# Patient Record
Sex: FEMALE | Race: WHITE | Employment: STUDENT | URBAN - METROPOLITAN AREA
[De-identification: names, ages, dates, MRNs, and addresses within clinical notes are randomized per-mention and may not be internally consistent; named-entity substitution may affect disease eponyms.]

---
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
## 2019-07-18 ENCOUNTER — OFFICE VISIT (OUTPATIENT)
Dept: URGENT CARE | Age: 19
End: 2019-07-18

## 2020-01-28 ENCOUNTER — OFFICE VISIT (OUTPATIENT)
Dept: INTERNAL MEDICINE CLINIC | Age: 20
End: 2020-01-28

## 2020-01-28 VITALS
HEART RATE: 91 BPM | DIASTOLIC BLOOD PRESSURE: 68 MMHG | BODY MASS INDEX: 20.44 KG/M2 | RESPIRATION RATE: 17 BRPM | SYSTOLIC BLOOD PRESSURE: 108 MMHG | TEMPERATURE: 98.6 F | HEIGHT: 60 IN | OXYGEN SATURATION: 99 % | WEIGHT: 104.1 LBS

## 2020-01-28 DIAGNOSIS — Z00.00 ANNUAL PHYSICAL EXAM: Primary | ICD-10-CM

## 2020-01-28 DIAGNOSIS — Z72.0 TOBACCO ABUSE: ICD-10-CM

## 2020-01-28 DIAGNOSIS — R00.2 PALPITATIONS: ICD-10-CM

## 2020-01-28 DIAGNOSIS — Z86.39 HISTORY OF HYPERTHYROIDISM: ICD-10-CM

## 2020-01-28 RX ORDER — LEVONORGESTREL AND ETHINYL ESTRADIOL 0.1-0.02MG
KIT ORAL
COMMUNITY
Start: 2020-01-01 | End: 2021-03-08 | Stop reason: ALTCHOICE

## 2020-01-28 RX ORDER — METOPROLOL TARTRATE 25 MG/1
25 TABLET, FILM COATED ORAL
COMMUNITY
Start: 2020-01-06 | End: 2021-03-08 | Stop reason: SDUPTHER

## 2020-01-28 NOTE — PROGRESS NOTES
Chief Complaint   Patient presents with    Complete Physical     Visit Vitals  /68 (BP 1 Location: Left arm, BP Patient Position: Sitting)   Pulse 91   Temp 98.6 °F (37 °C) (Oral)   Resp 17   Ht 5' (1.524 m)   Wt 104 lb 1.6 oz (47.2 kg)   LMP 01/28/2020 (Exact Date)   SpO2 99%   BMI 20.33 kg/m²     1. Have you been to the ER, urgent care clinic since your last visit? Hospitalized since your last visit? [facility] for palpitations    2. Have you seen or consulted any other health care providers outside of the [address] [place] [address] since your last visit? Include any pap smears or colon screening.  No

## 2020-01-28 NOTE — PATIENT INSTRUCTIONS
Well Visit, Ages 25 to 48: Care Instructions  Your Care Instructions    Physical exams can help you stay healthy. Your doctor has checked your overall health and may have suggested ways to take good care of yourself. He or she also may have recommended tests. At home, you can help prevent illness with healthy eating, regular exercise, and other steps. Follow-up care is a key part of your treatment and safety. Be sure to make and go to all appointments, and call your doctor if you are having problems. It's also a good idea to know your test results and keep a list of the medicines you take. How can you care for yourself at home? · Reach and stay at a healthy weight. This will lower your risk for many problems, such as obesity, diabetes, heart disease, and high blood pressure. · Get at least 30 minutes of physical activity on most days of the week. Walking is a good choice. You also may want to do other activities, such as running, swimming, cycling, or playing tennis or team sports. Discuss any changes in your exercise program with your doctor. · Do not smoke or allow others to smoke around you. If you need help quitting, talk to your doctor about stop-smoking programs and medicines. These can increase your chances of quitting for good. · Talk to your doctor about whether you have any risk factors for sexually transmitted infections (STIs). Having one sex partner (who does not have STIs and does not have sex with anyone else) is a good way to avoid these infections. · Use birth control if you do not want to have children at this time. Talk with your doctor about the choices available and what might be best for you. · Protect your skin from too much sun. When you're outdoors from 10 a.m. to 4 p.m., stay in the shade or cover up with clothing and a hat with a wide brim. Wear sunglasses that block UV rays. Even when it's cloudy, put broad-spectrum sunscreen (SPF 30 or higher) on any exposed skin.   · See a dentist one or two times a year for checkups and to have your teeth cleaned. · Wear a seat belt in the car. Follow your doctor's advice about when to have certain tests. These tests can spot problems early. For everyone  · Cholesterol. Have the fat (cholesterol) in your blood tested after age 21. Your doctor will tell you how often to have this done based on your age, family history, or other things that can increase your risk for heart disease. · Blood pressure. Have your blood pressure checked during a routine doctor visit. Your doctor will tell you how often to check your blood pressure based on your age, your blood pressure results, and other factors. · Vision. Talk with your doctor about how often to have a glaucoma test.  · Diabetes. Ask your doctor whether you should have tests for diabetes. · Colon cancer. Your risk for colorectal cancer gets higher as you get older. Some experts say that adults should start regular screening at age 48 and stop at age 76. Others say to start before age 48 or continue after age 76. Talk with your doctor about your risk and when to start and stop screening. For women  · Breast exam and mammogram. Talk to your doctor about when you should have a clinical breast exam and a mammogram. Medical experts differ on whether and how often women under 50 should have these tests. Your doctor can help you decide what is right for you. · Cervical cancer screening test and pelvic exam. Begin with a Pap test at age 24. The test often is part of a pelvic exam. Starting at age 27, you may choose to have a Pap test, an HPV test, or both tests at the same time (called co-testing). Talk with your doctor about how often to have testing. · Tests for sexually transmitted infections (STIs). Ask whether you should have tests for STIs. You may be at risk if you have sex with more than one person, especially if your partners do not wear condoms.   For men  · Tests for sexually transmitted infections (STIs). Ask whether you should have tests for STIs. You may be at risk if you have sex with more than one person, especially if you do not wear a condom. · Testicular cancer exam. Ask your doctor whether you should check your testicles regularly. · Prostate exam. Talk to your doctor about whether you should have a blood test (called a PSA test) for prostate cancer. Experts differ on whether and when men should have this test. Some experts suggest it if you are older than 39 and are -American or have a father or brother who got prostate cancer when he was younger than 72. When should you call for help? Watch closely for changes in your health, and be sure to contact your doctor if you have any problems or symptoms that concern you. Where can you learn more? Go to http://lizandro-sadie.info/. Enter P072 in the search box to learn more about \"Well Visit, Ages 25 to 48: Care Instructions. \"  Current as of: December 13, 2018  Content Version: 12.2  © 9960-0366 [employer]. Care instructions adapted under license by [employer] (which disclaims liability or warranty for this information). If you have questions about a medical condition or this instruction, always ask your healthcare professional. [place] [address] any warranty or liability for your use of this information.

## 2020-01-28 NOTE — PROGRESS NOTES
Complete Physical       HPI:     Subha Craig is a 20y.o. year old female who is here for her annual comprehensive healthcare exam and establishment of care as she is a new patient to the practice. She has a past history of depression for which she was previously on Prozac as well as a history of anxiety and about a year and a half ago had a diagnosis of hyperthyroidism for which she is on endocrinologist.  At that time she did have palpitations was given a dose of metoprolol to take as needed for palpitations but did not have follow-up regarding her hyperthyroidism. [facility] [address] [facility] 2 occasions for palpitations once on January 5 and the most recent on January 24. She did bring lab studies from the January 24 visit the remarkable for a low magnesium at 1.6 but otherwise normal electrolytes although potassium was on the low normal range at 3.7. She was treated in the emergency room with IV magnesium she does note the palpitations have decreased since then. She notes no chest pain except for occasional wakes in the morning with some chest tightness but that goes away with time. She does not eat it usually into midday so food intake does not affect the chest tightness but he gets better on its own as she is up and around. She does not really describe acid reflux type. She denies any other GI complaints and she has no  complaints. Other than the palpitation she has no cardiorespiratory complaints. She has no headaches, dizziness or neurologic complaints. She has no other complaints on complete review of systems.              Visit Vitals  /68 (BP 1 Location: Left arm, BP Patient Position: Sitting)   Pulse 91   Temp 98.6 °F (37 °C) (Oral)   Resp 17   Ht 5' (1.524 m)   Wt 104 lb 1.6 oz (47.2 kg)   LMP 01/28/2020 (Exact Date)   SpO2 99%   BMI 20.33 kg/m²       Past Medical History:   Diagnosis Date    Anxiety     Depression     mild/ Anxiety    Palpitations        History reviewed. No pertinent surgical history. Prior to Admission medications    Medication Sig Start Date End Date Taking? Authorizing Provider   metoprolol tartrate (LOPRESSOR) 25 mg tablet 25 mg. Indications: takes as needed for heart palpitations 1/6/20  Yes Provider, Historical   AVIANE 0.1-20 mg-mcg tab TAKE 1 TABLET BY MOUTH ONCE DAILY 1/1/20  Yes Provider, Historical        No Known Allergies     History reviewed. No pertinent family history.     Social History     Socioeconomic History    Marital status: SINGLE     Spouse name: Not on file    Number of children: Not on file    Years of education: Not on file    Highest education level: Not on file   Occupational History    Not on file   Social Needs    Financial resource strain: Not on file    Food insecurity:     Worry: Not on file     Inability: Not on file    Transportation needs:     Medical: Not on file     Non-medical: Not on file   Tobacco Use    Smoking status: Current Every Day Smoker     Packs/day: 0.25    Smokeless tobacco: Current User    Tobacco comment: smokes 1 cigarette a day   Substance and Sexual Activity    Alcohol use: No    Drug use: No    Sexual activity: Never     Birth control/protection: Injection   Lifestyle    Physical activity:     Days per week: Not on file     Minutes per session: Not on file    Stress: Not on file   Relationships    Social connections:     Talks on phone: Not on file     Gets together: Not on file     Attends Sabianism service: Not on file     Active member of club or organization: Not on file     Attends meetings of clubs or organizations: Not on file     Relationship status: Not on file    Intimate partner violence:     Fear of current or ex partner: Not on file     Emotionally abused: Not on file     Physically abused: Not on file     Forced sexual activity: Not on file   Other Topics Concern    Not on file   Social History Narrative    Not on file        ROS: Constitutional: She denies fevers, weight loss, sweats. Eyes: No blurred or double vision. ENT: No difficulty with swallowing, taste, speech or smell. NECK: no stiffness swelling or lymph node enlargement  Respiratory: No cough wheezing or shortness of breath. Cardiovascular: Denies chest pain, unexplained indigestion or syncope. Sadiq Plough Positive for palpitations as noted  Breast: She has noted no masses or lumps and no discharge or axillary swelling  Gastrointestinal:  No changes in bowel movements, no abdominal pain, no bloating. Genitourinary: No discharge or abnormal bleeding or pain  Extremities: No joint pain, stiffness or swelling. Neurological:  No numbness, tingling, burring paresthesias or loss of motor strength. No syncope, dizziness or frequent headache  Skin:  No recent rashes or mole changes. Psychiatric/Behavioral:  Negative for depression. The patient is not nervous/anxious. HEMATOLOGIC: no easy bruising or bleeding gums  Endocrine: no sweats of urinary frequency or excessive thirst      Physical Examination:     Vitals:    01/28/20 1332   BP: 108/68   Pulse: 91   Resp: 17   Temp: 98.6 °F (37 °C)   TempSrc: Oral   SpO2: 99%   Weight: 104 lb 1.6 oz (47.2 kg)   Height: 5' (1.524 m)   PainSc:   0 - No pain   LMP: 01/28/2020        General appearance - alert, well appearing, and in no distress  Mental status - alert, oriented to person, place, and time  HEENT:  Ears - bilateral TM's and external ear canals clear  Eyes - pupillary responses were normal.  Extraocular muscle function intact. Lids and conjunctiva not injected. Fundoscopic exam revealed sharp disc margins. eye movements intact  Pharynx- clear with teeth in good repair. No masses were noted  Neck - supple without thyromegaly or burit. No JVD noted  Lungs - clear to auscultation and percussion  Cardiac- normal rate, regular rhythm without murmurs. PMI not displaced.   No gallop, rub or click  Breast: deferred to GYN  Abdomen - flat, soft, non-tender without palpable organomegaly or mass. No pulsatile mass was felt, and not bruit was heard. Bowel sounds were active   Female - deferred to GYN  Rectal - deferred to GYN  Extremities -  no clubbing cyanosis or edema  Lymphatics - no palpable lymphadenopathy, no hepatosplenomegaly  Peripheral vascular - Dorsalis pedis and posterior tibial pulses felt without difficulty  Skin - no rash or unusual mole change noted  Neurological - Cranial nerves II-XII grossly intact. Motor strength 5/5. DTR's 2+ and symmetric. Station and gait normal  Back exam - full range of motion, no tenderness, palpable spasm or pain on motion  Musculoskeletal - no joint tenderness, deformity or swelling  Hematologic: no purpura, petechiae or bruising    Assessment/Plan:     1. Annual physical exam    2. Palpitations    3. History of hyperthyroidism    4. Tobacco abuse      Impression  1. Annual physical examination normal weight is on the low side  2. Palpitations poss related to electrolyte abnormalities we will see what the repeat magnesium looks like today along with her repeat potassium and also to check her thyroid status with a history of hyperthyroidism. 3.  History of hypothyroidism repeat status pending  4. Tobacco abuse she does smoke anywhere from 3 to 5 cigarettes a day but does not smoke on an every day basis. I did caution regarding that and encouraged her to stop completely. We did caution regarding increased cardiovascular risk including palpitations as well as increased long-term effects on the lungs and increased risk of cancer long-term. We discussed ways to stop including Chantix, Wellbutrin, nicotine gum or nicotine patches. Follow-up to be determined based upon the above findings. I will recheck a myself periodically or sooner if I need to based upon lab studies.     Orders Placed This Encounter    CBC WITH AUTOMATED DIFF    METABOLIC PANEL, COMPREHENSIVE    T4, FREE    TSH 3RD GENERATION  URINALYSIS W/ RFLX MICROSCOPIC    MAGNESIUM    [place] SMOKING AND TOBACCO USE CESSATION 3 - 10 MINUTES    metoprolol tartrate (LOPRESSOR) 25 mg tablet     Si mg. Indications: takes as needed for heart palpitations    AVIANE 0.1-20 mg-mcg tab     Sig: TAKE 1 TABLET BY MOUTH ONCE DAILY        No results found for any visits on 20. I have reviewed the patient's medical history in detail and updated the computerized patient record. We had a prolonged discussion about these complex clinical issues and went over the various important aspects to consider. All questions were answered. Advised her to call back or return to office if symptoms do not improve, change in nature, or persist.    She was given an after visit summary or informed of [employer] Access which includes patient instructions, diagnoses, current medications, & vitals. She expressed understanding with the diagnosis and plan.       David Lemos MD

## 2020-01-29 LAB
ALBUMIN SERPL-MCNC: 4.6 G/DL (ref 3.9–5)
ALBUMIN/GLOB SERPL: 1.7 {RATIO} (ref 1.2–2.2)
ALP SERPL-CCNC: 33 IU/L (ref 39–117)
ALT SERPL-CCNC: 11 IU/L (ref 0–32)
APPEARANCE UR: CLEAR
AST SERPL-CCNC: 17 IU/L (ref 0–40)
BACTERIA #/AREA URNS HPF: NORMAL /[HPF]
BASOPHILS # BLD AUTO: 0.1 X10E3/UL (ref 0–0.2)
BASOPHILS NFR BLD AUTO: 1 %
BILIRUB SERPL-MCNC: 0.6 MG/DL (ref 0–1.2)
BILIRUB UR QL STRIP: NEGATIVE
BUN SERPL-MCNC: 13 MG/DL (ref 6–20)
BUN/CREAT SERPL: 25 (ref 9–23)
CALCIUM SERPL-MCNC: 9.3 MG/DL (ref 8.7–10.2)
CASTS URNS QL MICRO: NORMAL /LPF
CHLORIDE SERPL-SCNC: 102 MMOL/L (ref 96–106)
CO2 SERPL-SCNC: 22 MMOL/L (ref 20–29)
COLOR UR: YELLOW
CREAT SERPL-MCNC: 0.53 MG/DL (ref 0.57–1)
EOSINOPHIL # BLD AUTO: 0.1 X10E3/UL (ref 0–0.4)
EOSINOPHIL NFR BLD AUTO: 3 %
EPI CELLS #/AREA URNS HPF: NORMAL /HPF (ref 0–10)
ERYTHROCYTE [DISTWIDTH] IN BLOOD BY AUTOMATED COUNT: 12.2 % (ref 11.7–15.4)
GLOBULIN SER CALC-MCNC: 2.7 G/DL (ref 1.5–4.5)
GLUCOSE SERPL-MCNC: 89 MG/DL (ref 65–99)
GLUCOSE UR QL: NEGATIVE
HCT VFR BLD AUTO: 40.2 % (ref 34–46.6)
HGB BLD-MCNC: 13.7 G/DL (ref 11.1–15.9)
HGB UR QL STRIP: ABNORMAL
IMM GRANULOCYTES # BLD AUTO: 0 X10E3/UL (ref 0–0.1)
IMM GRANULOCYTES NFR BLD AUTO: 0 %
KETONES UR QL STRIP: NEGATIVE
LEUKOCYTE ESTERASE UR QL STRIP: NEGATIVE
LYMPHOCYTES # BLD AUTO: 1.8 X10E3/UL (ref 0.7–3.1)
LYMPHOCYTES NFR BLD AUTO: 40 %
MAGNESIUM SERPL-MCNC: 1.7 MG/DL (ref 1.6–2.3)
MCH RBC QN AUTO: 31.2 PG (ref 26.6–33)
MCHC RBC AUTO-ENTMCNC: 34.1 G/DL (ref 31.5–35.7)
MCV RBC AUTO: 92 FL (ref 79–97)
MICRO URNS: ABNORMAL
MONOCYTES # BLD AUTO: 0.4 X10E3/UL (ref 0.1–0.9)
MONOCYTES NFR BLD AUTO: 9 %
MUCOUS THREADS URNS QL MICRO: PRESENT
NEUTROPHILS # BLD AUTO: 2.1 X10E3/UL (ref 1.4–7)
NEUTROPHILS NFR BLD AUTO: 47 %
NITRITE UR QL STRIP: NEGATIVE
PH UR STRIP: 7.5 [PH] (ref 5–7.5)
PLATELET # BLD AUTO: 289 X10E3/UL (ref 150–450)
POTASSIUM SERPL-SCNC: 4 MMOL/L (ref 3.5–5.2)
PROT SERPL-MCNC: 7.3 G/DL (ref 6–8.5)
PROT UR QL STRIP: NEGATIVE
RBC # BLD AUTO: 4.39 X10E6/UL (ref 3.77–5.28)
RBC #/AREA URNS HPF: NORMAL /HPF (ref 0–2)
SODIUM SERPL-SCNC: 140 MMOL/L (ref 134–144)
SP GR UR: 1.02 (ref 1–1.03)
T4 FREE SERPL-MCNC: 1.35 NG/DL (ref 0.82–1.77)
TSH SERPL DL<=0.005 MIU/L-ACNC: 0.93 UIU/ML (ref 0.45–4.5)
UROBILINOGEN UR STRIP-MCNC: 0.2 MG/DL (ref 0.2–1)
WBC # BLD AUTO: 4.6 X10E3/UL (ref 3.4–10.8)
WBC #/AREA URNS HPF: NORMAL /HPF (ref 0–5)

## 2020-02-27 PROBLEM — Z00.00 ANNUAL PHYSICAL EXAM: Status: RESOLVED | Noted: 2020-01-28 | Resolved: 2020-02-27

## 2021-03-07 PROBLEM — F41.9 ANXIETY: Status: ACTIVE | Noted: 2021-03-07

## 2021-03-07 NOTE — PROGRESS NOTES
Complete Physical       HPI:     Kevin Up is a 23y.o. year old female who is here for her annual comprehensive healthcare exam of her medical problems include history of palpitations, anxiety with history of being on Xanax which she did not like before and now she has developed a problem with a headache that she has had on a daily basis over the past 4 months. This headache is usually in the front part of the head but it can be 1 side versus the other not always the same side. There are no associated visual symptoms with headaches but she does feel like her eyes are strained and she does have an eye doctor appointment. She does wear glasses normally. She denies any associated nausea vomiting. She denies any cardiorespiratory complaints. She notes no GI or  complaints. Other than the headaches and no neurologic complaints. She does feel like her anxiety is getting bad again but previously as noted was on Xanax once a day and she stopped it because she did not like the way it made her feel. She was previously on Zoloft which seemed to make her worse and she was on Prozac at one time in the past which seemed to work fairly well for her. She does not however feel that she is depressed. She specifically denies any suicidal thoughts ideations. Visit Vitals  /74 (BP 1 Location: Left upper arm, BP Patient Position: Sitting, BP Cuff Size: Adult)   Pulse (!) 101   Temp 98.8 °F (37.1 °C) (Temporal)   Resp 16   Ht 5' (1.524 m)   Wt 129 lb 6.4 oz (58.7 kg)   LMP 02/22/2021 (Exact Date)   SpO2 98%   BMI 25.27 kg/m²       Past Medical History:   Diagnosis Date    Anxiety     Depression     mild/ Anxiety    Palpitations        History reviewed. No pertinent surgical history. Prior to Admission medications    Medication Sig Start Date End Date Taking? Authorizing Provider   norelgestromin-ethinyl estradiol Paulene Riedel) 150-35 mcg/24 hr 1 Patch by TransDERmal route Every Saturday.    Yes Provider, Historical   metoprolol tartrate (LOPRESSOR) 25 mg tablet 25 mg. Indications: takes as needed for heart palpitations  Indications: takes as needed for heart palpitations 3/8/21  Yes Ayaka Woods MD   escitalopram oxalate (LEXAPRO) 10 mg tablet Take 1 Tab by mouth daily. 3/8/21  Yes Ayaka Woods MD        No Known Allergies     History reviewed. No pertinent family history.     Social History     Socioeconomic History    Marital status: SINGLE     Spouse name: Not on file    Number of children: Not on file    Years of education: Not on file    Highest education level: Not on file   Occupational History    Not on file   Social Needs    Financial resource strain: Not on file    Food insecurity     Worry: Not on file     Inability: Not on file    Transportation needs     Medical: Not on file     Non-medical: Not on file   Tobacco Use    Smoking status: Former Smoker     Packs/day: 0.25     Quit date: 2020     Years since quittin.3    Smokeless tobacco: Never Used    Tobacco comment: smokes 1 cigarette a day   Substance and Sexual Activity    Alcohol use: No    Drug use: No    Sexual activity: Never     Birth control/protection: Injection   Lifestyle    Physical activity     Days per week: Not on file     Minutes per session: Not on file    Stress: Not on file   Relationships    Social connections     Talks on phone: Not on file     Gets together: Not on file     Attends Adventism service: Not on file     Active member of club or organization: Not on file     Attends meetings of clubs or organizations: Not on file     Relationship status: Not on file    Intimate partner violence     Fear of current or ex partner: Not on file     Emotionally abused: Not on file     Physically abused: Not on file     Forced sexual activity: Not on file   Other Topics Concern    Not on file   Social History Narrative    Not on file        ROS:     Constitutional: She denies fevers, weight loss, sweats. Eyes: No blurred or double vision. ENT: No difficulty with swallowing, taste, speech or smell. NECK: no stiffness swelling or lymph node enlargement  Respiratory: No cough wheezing or shortness of breath. Cardiovascular: Denies chest pain, palpitations, unexplained indigestion or syncope. Breast: She has noted no masses or lumps and no discharge or axillary swelling  Gastrointestinal:  No changes in bowel movements, no abdominal pain, no bloating. Genitourinary: No discharge or abnormal bleeding or pain  Extremities: No joint pain, stiffness or swelling. Neurological:  No numbness, tingling, burring paresthesias or loss of motor strength. No syncope, dizziness but positive for daily frontal headache now for about 4 months  Skin:  No recent rashes or mole changes. Psychiatric/Behavioral:  Negative for depression. The patient is noting increased anxiety  HEMATOLOGIC: no easy bruising or bleeding gums  Endocrine: no sweats of urinary frequency or excessive thirst      Physical Examination:     Vitals:    03/08/21 1419   BP: 136/74   Pulse: (!) 101   Resp: 16   Temp: 98.8 °F (37.1 °C)   TempSrc: Temporal   SpO2: 98%   Weight: 129 lb 6.4 oz (58.7 kg)   Height: 5' (1.524 m)   PainSc:   0 - No pain   LMP: 02/22/2021        General appearance - alert, well appearing, and in no distress  Mental status - alert, oriented to person, place, and time  HEENT:  Ears - bilateral TM's and external ear canals clear  Eyes - pupillary responses were normal.  Extraocular muscle function intact. Lids and conjunctiva not injected. Fundoscopic exam revealed sharp disc margins. eye movements intact  Pharynx- clear with teeth in good repair. No masses were noted  Neck - supple without thyromegaly or burit. No JVD noted  Lungs - clear to auscultation and percussion  Cardiac- normal rate, regular rhythm without murmurs. PMI not displaced.   No gallop, rub or click  Breast: deferred to GYN  Abdomen - flat, soft, non-tender without palpable organomegaly or mass. No pulsatile mass was felt, and not bruit was heard. Bowel sounds were active   Female - deferred to GYN  Rectal - deferred to GYN  Extremities -  no clubbing cyanosis or edema  Lymphatics - no palpable lymphadenopathy, no hepatosplenomegaly  Peripheral vascular - Dorsalis pedis and posterior tibial pulses felt without difficulty  Skin - no rash or unusual mole change noted  Neurological - Cranial nerves II-XII grossly intact. Motor strength 5/5. DTR's 2+ and symmetric. Station and gait normal  Back exam - full range of motion, no tenderness, palpable spasm or pain on motion  Musculoskeletal - no joint tenderness, deformity or swelling  Hematologic: no purpura, petechiae or bruising    Assessment/Plan:     1. Annual physical exam    2. History of hyperthyroidism    3. Palpitations    4. Chronic paroxysmal hemicrania, not intractable    5. Anxiety      Impression  1. Annual physical examination is normal  2. Hyperthyroidism in the past repeat status is pending  3. History of palpitations heart rates a little up today she is off metoprolol and going to resume diet  4. Headaches chronic now for 4 months I am a bit concerned about this some schedule a head CT. I will get her back on a beta-blocker which may help this  5. Anxiety we will resume her treatment starting with Lexapro since that is more of an antianxiety component than Prozac he has. Recheck with me in 1 month. I will call her with results of lab and head CT. Orders Placed This Encounter    CT HEAD W WO CONT     Standing Status:   Future     Standing Expiration Date:   4/8/2022     Order Specific Question:   Is Patient Pregnant? Answer:   No     Order Specific Question:   STAT Creatinine as indicated     Answer:    Yes    CBC WITH AUTOMATED DIFF    METABOLIC PANEL, COMPREHENSIVE    T4, FREE    TSH 3RD GENERATION    URINALYSIS W/ RFLX MICROSCOPIC    LIPID PANEL    norelgestromin-ethinyl estradiol Brandy Altman) 150-35 mcg/24 hr     Si Patch by TransDERmal route Every Saturday.  metoprolol tartrate (LOPRESSOR) 25 mg tablet     Si mg. Indications: takes as needed for heart palpitations  Indications: takes as needed for heart palpitations     Dispense:  30 Tab     Refill:  prn    escitalopram oxalate (LEXAPRO) 10 mg tablet     Sig: Take 1 Tab by mouth daily. Dispense:  30 Tab     Refill:  prn        No results found for any visits on 21. I have reviewed the patient's medical history in detail and updated the computerized patient record. We had a prolonged discussion about these complex clinical issues and went over the various important aspects to consider. All questions were answered. Advised her to call back or return to office if symptoms do not improve, change in nature, or persist.    She was given an after visit summary or informed of [employer] Access which includes patient instructions, diagnoses, current medications, & vitals. She expressed understanding with the diagnosis and plan.       Carlos Canela MD

## 2021-03-08 ENCOUNTER — OFFICE VISIT (OUTPATIENT)
Dept: INTERNAL MEDICINE CLINIC | Age: 21
End: 2021-03-08
Payer: MEDICAID

## 2021-03-08 VITALS
DIASTOLIC BLOOD PRESSURE: 74 MMHG | OXYGEN SATURATION: 98 % | BODY MASS INDEX: 25.4 KG/M2 | TEMPERATURE: 98.8 F | HEIGHT: 60 IN | WEIGHT: 129.4 LBS | HEART RATE: 101 BPM | SYSTOLIC BLOOD PRESSURE: 136 MMHG | RESPIRATION RATE: 16 BRPM

## 2021-03-08 DIAGNOSIS — Z86.39 HISTORY OF HYPERTHYROIDISM: ICD-10-CM

## 2021-03-08 DIAGNOSIS — F41.9 ANXIETY: ICD-10-CM

## 2021-03-08 DIAGNOSIS — R00.2 PALPITATIONS: ICD-10-CM

## 2021-03-08 DIAGNOSIS — Z00.00 ANNUAL PHYSICAL EXAM: Primary | ICD-10-CM

## 2021-03-08 DIAGNOSIS — G44.049 CHRONIC PAROXYSMAL HEMICRANIA, NOT INTRACTABLE: ICD-10-CM

## 2021-03-08 PROBLEM — R51.9 HEADACHE: Status: ACTIVE | Noted: 2021-03-08

## 2021-03-08 PROCEDURE — 99395 PREV VISIT EST AGE 18-39: CPT | Performed by: INTERNAL MEDICINE

## 2021-03-08 RX ORDER — ESCITALOPRAM OXALATE 10 MG/1
10 TABLET ORAL DAILY
Qty: 30 TAB | Status: SHIPPED | OUTPATIENT
Start: 2021-03-08 | End: 2021-07-12 | Stop reason: ALTCHOICE

## 2021-03-08 RX ORDER — METOPROLOL TARTRATE 25 MG/1
TABLET, FILM COATED ORAL
Qty: 30 TAB | Status: SHIPPED | OUTPATIENT
Start: 2021-03-08 | End: 2022-03-11

## 2021-03-08 RX ORDER — NORELGESTROMIN AND ETHINYL ESTRADIOL 150; 35 UG/D; UG/D
1 PATCH TRANSDERMAL
COMMUNITY
End: 2021-05-18 | Stop reason: ALTCHOICE

## 2021-03-08 NOTE — PROGRESS NOTES
Chief Complaint   Patient presents with    Complete Physical     Visit Vitals  /74 (BP 1 Location: Left upper arm, BP Patient Position: Sitting, BP Cuff Size: Adult)   Pulse (!) 101   Temp 98.8 °F (37.1 °C) (Temporal)   Resp 16   Ht 5' (1.524 m)   Wt 129 lb 6.4 oz (58.7 kg)   LMP 02/22/2021 (Exact Date)   SpO2 98%   BMI 25.27 kg/m²     1. Have you been to the ER, urgent care clinic since your last visit? Hospitalized since your last visit? No    2. Have you seen or consulted any other health care providers outside of the [address] [place] [address] since your last visit? Include any pap smears or colon screening.  No

## 2021-03-08 NOTE — PATIENT INSTRUCTIONS
Well Visit, Ages 25 to 48: Care Instructions Your Care Instructions Physical exams can help you stay healthy. Your doctor has checked your overall health and may have suggested ways to take good care of yourself. He or she also may have recommended tests. At home, you can help prevent illness with healthy eating, regular exercise, and other steps. Follow-up care is a key part of your treatment and safety. Be sure to make and go to all appointments, and call your doctor if you are having problems. It's also a good idea to know your test results and keep a list of the medicines you take. How can you care for yourself at home? · Reach and stay at a healthy weight. This will lower your risk for many problems, such as obesity, diabetes, heart disease, and high blood pressure. · Get at least 30 minutes of physical activity on most days of the week. Walking is a good choice. You also may want to do other activities, such as running, swimming, cycling, or playing tennis or team sports. Discuss any changes in your exercise program with your doctor. · Do not smoke or allow others to smoke around you. If you need help quitting, talk to your doctor about stop-smoking programs and medicines. These can increase your chances of quitting for good. · Talk to your doctor about whether you have any risk factors for sexually transmitted infections (STIs). Having one sex partner (who does not have STIs and does not have sex with anyone else) is a good way to avoid these infections. · Use birth control if you do not want to have children at this time. Talk with your doctor about the choices available and what might be best for you. · Protect your skin from too much sun. When you're outdoors from 10 a.m. to 4 p.m., stay in the shade or cover up with clothing and a hat with a wide brim. Wear sunglasses that block UV rays. Even when it's cloudy, put broad-spectrum sunscreen (SPF 30 or higher) on any exposed skin. · See a dentist one or two times a year for checkups and to have your teeth cleaned. · Wear a seat belt in the car. Follow your doctor's advice about when to have certain tests. These tests can spot problems early. For everyone · Cholesterol. Have the fat (cholesterol) in your blood tested after age 21. Your doctor will tell you how often to have this done based on your age, family history, or other things that can increase your risk for heart disease. · Blood pressure. Have your blood pressure checked during a routine doctor visit. Your doctor will tell you how often to check your blood pressure based on your age, your blood pressure results, and other factors. · Vision. Talk with your doctor about how often to have a glaucoma test. 
· Diabetes. Ask your doctor whether you should have tests for diabetes. · Colon cancer. Your risk for colorectal cancer gets higher as you get older. Some experts say that adults should start regular screening at age 48 and stop at age 76. Others say to start before age 48 or continue after age 76. Talk with your doctor about your risk and when to start and stop screening. For women · Breast exam and mammogram. Talk to your doctor about when you should have a clinical breast exam and a mammogram. Medical experts differ on whether and how often women under 50 should have these tests. Your doctor can help you decide what is right for you. · Cervical cancer screening test and pelvic exam. Begin with a Pap test at age 24. The test often is part of a pelvic exam. Starting at age 27, you may choose to have a Pap test, an HPV test, or both tests at the same time (called co-testing). Talk with your doctor about how often to have testing. · Tests for sexually transmitted infections (STIs). Ask whether you should have tests for STIs. You may be at risk if you have sex with more than one person, especially if your partners do not wear condoms. For men · Tests for sexually transmitted infections (STIs). Ask whether you should have tests for STIs. You may be at risk if you have sex with more than one person, especially if you do not wear a condom. · Testicular cancer exam. Ask your doctor whether you should check your testicles regularly. · Prostate exam. Talk to your doctor about whether you should have a blood test (called a PSA test) for prostate cancer. Experts differ on whether and when men should have this test. Some experts suggest it if you are older than 39 and are -American or have a father or brother who got prostate cancer when he was younger than 72. When should you call for help? Watch closely for changes in your health, and be sure to contact your doctor if you have any problems or symptoms that concern you. Where can you learn more? Go to http://www.harvey.com/ Enter P072 in the search box to learn more about \"Well Visit, Ages 25 to 48: Care Instructions. \" Current as of: May 27, 2020               Content Version: 12.6 © 2006-2020 [employer]. Care instructions adapted under license by [employer] (which disclaims liability or warranty for this information). If you have questions about a medical condition or this instruction, always ask your healthcare professional. [address] any warranty or liability for your use of this information.

## 2021-03-09 LAB
ALBUMIN SERPL-MCNC: 4.6 G/DL (ref 3.9–5)
ALBUMIN/GLOB SERPL: 1.7 {RATIO} (ref 1.2–2.2)
ALP SERPL-CCNC: 35 IU/L (ref 39–117)
ALT SERPL-CCNC: 8 IU/L (ref 0–32)
APPEARANCE UR: CLEAR
AST SERPL-CCNC: 15 IU/L (ref 0–40)
BASOPHILS # BLD AUTO: 0.1 X10E3/UL (ref 0–0.2)
BASOPHILS NFR BLD AUTO: 1 %
BILIRUB SERPL-MCNC: 0.3 MG/DL (ref 0–1.2)
BILIRUB UR QL STRIP: NEGATIVE
BUN SERPL-MCNC: 13 MG/DL (ref 6–20)
BUN/CREAT SERPL: 22 (ref 9–23)
CALCIUM SERPL-MCNC: 9.1 MG/DL (ref 8.7–10.2)
CHLORIDE SERPL-SCNC: 101 MMOL/L (ref 96–106)
CHOLEST SERPL-MCNC: 130 MG/DL (ref 100–199)
CO2 SERPL-SCNC: 24 MMOL/L (ref 20–29)
COLOR UR: YELLOW
CREAT SERPL-MCNC: 0.58 MG/DL (ref 0.57–1)
EOSINOPHIL # BLD AUTO: 0.1 X10E3/UL (ref 0–0.4)
EOSINOPHIL NFR BLD AUTO: 2 %
ERYTHROCYTE [DISTWIDTH] IN BLOOD BY AUTOMATED COUNT: 12.1 % (ref 11.7–15.4)
GLOBULIN SER CALC-MCNC: 2.7 G/DL (ref 1.5–4.5)
GLUCOSE SERPL-MCNC: 72 MG/DL (ref 65–99)
GLUCOSE UR QL: NEGATIVE
HCT VFR BLD AUTO: 37.4 % (ref 34–46.6)
HDLC SERPL-MCNC: 69 MG/DL
HGB BLD-MCNC: 13 G/DL (ref 11.1–15.9)
HGB UR QL STRIP: NEGATIVE
IMM GRANULOCYTES # BLD AUTO: 0 X10E3/UL (ref 0–0.1)
IMM GRANULOCYTES NFR BLD AUTO: 0 %
KETONES UR QL STRIP: NEGATIVE
LDLC SERPL CALC-MCNC: 39 MG/DL (ref 0–99)
LEUKOCYTE ESTERASE UR QL STRIP: NEGATIVE
LYMPHOCYTES # BLD AUTO: 1.6 X10E3/UL (ref 0.7–3.1)
LYMPHOCYTES NFR BLD AUTO: 29 %
MCH RBC QN AUTO: 31.6 PG (ref 26.6–33)
MCHC RBC AUTO-ENTMCNC: 34.8 G/DL (ref 31.5–35.7)
MCV RBC AUTO: 91 FL (ref 79–97)
MICRO URNS: NORMAL
MONOCYTES # BLD AUTO: 0.6 X10E3/UL (ref 0.1–0.9)
MONOCYTES NFR BLD AUTO: 10 %
NEUTROPHILS # BLD AUTO: 3.3 X10E3/UL (ref 1.4–7)
NEUTROPHILS NFR BLD AUTO: 58 %
NITRITE UR QL STRIP: NEGATIVE
PH UR STRIP: 7.5 [PH] (ref 5–7.5)
PLATELET # BLD AUTO: 289 X10E3/UL (ref 150–450)
POTASSIUM SERPL-SCNC: 3.6 MMOL/L (ref 3.5–5.2)
PROT SERPL-MCNC: 7.3 G/DL (ref 6–8.5)
PROT UR QL STRIP: NEGATIVE
RBC # BLD AUTO: 4.11 X10E6/UL (ref 3.77–5.28)
SODIUM SERPL-SCNC: 139 MMOL/L (ref 134–144)
SP GR UR: 1.02 (ref 1–1.03)
T4 FREE SERPL-MCNC: 1.27 NG/DL (ref 0.82–1.77)
TRIGL SERPL-MCNC: 126 MG/DL (ref 0–149)
TSH SERPL DL<=0.005 MIU/L-ACNC: 0.76 UIU/ML (ref 0.45–4.5)
UROBILINOGEN UR STRIP-MCNC: 0.2 MG/DL (ref 0.2–1)
VLDLC SERPL CALC-MCNC: 22 MG/DL (ref 5–40)
WBC # BLD AUTO: 5.7 X10E3/UL (ref 3.4–10.8)

## 2021-03-24 DIAGNOSIS — R51.9 FREQUENT HEADACHES: Primary | ICD-10-CM

## 2021-04-06 PROBLEM — Z00.00 ANNUAL PHYSICAL EXAM: Status: RESOLVED | Noted: 2020-01-28 | Resolved: 2021-04-06

## 2021-04-09 ENCOUNTER — TELEPHONE (OUTPATIENT)
Dept: INTERNAL MEDICINE CLINIC | Age: 21
End: 2021-04-09

## 2021-04-09 NOTE — TELEPHONE ENCOUNTER
Patient cancelled appt because she is running a temperature.   She will get a covid test and call back with results and reschedule appt

## 2021-05-18 ENCOUNTER — OFFICE VISIT (OUTPATIENT)
Dept: NEUROLOGY | Age: 21
End: 2021-05-18
Payer: COMMERCIAL

## 2021-05-18 VITALS
RESPIRATION RATE: 12 BRPM | HEIGHT: 62 IN | HEART RATE: 88 BPM | WEIGHT: 135 LBS | SYSTOLIC BLOOD PRESSURE: 116 MMHG | DIASTOLIC BLOOD PRESSURE: 72 MMHG | BODY MASS INDEX: 24.84 KG/M2

## 2021-05-18 DIAGNOSIS — G44.209 TENSION VASCULAR HEADACHE: Primary | ICD-10-CM

## 2021-05-18 DIAGNOSIS — G43.709 CHRONIC MIGRAINE WITHOUT AURA, NOT INTRACTABLE, WITHOUT STATUS MIGRAINOSUS: ICD-10-CM

## 2021-05-18 DIAGNOSIS — G56.22 NEUROPATHY OF LEFT ULNAR NERVE AT WRIST: ICD-10-CM

## 2021-05-18 PROCEDURE — 99244 OFF/OP CNSLTJ NEW/EST MOD 40: CPT | Performed by: PSYCHIATRY & NEUROLOGY

## 2021-05-18 RX ORDER — SULFAMETHOXAZOLE AND TRIMETHOPRIM 800; 160 MG/1; MG/1
TABLET ORAL
COMMUNITY
Start: 2021-05-09 | End: 2021-07-12 | Stop reason: ALTCHOICE

## 2021-05-18 RX ORDER — TIZANIDINE 4 MG/1
4 TABLET ORAL
Qty: 30 TAB | Refills: 11 | Status: SHIPPED | OUTPATIENT
Start: 2021-05-18 | End: 2021-07-12 | Stop reason: ALTCHOICE
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
## 2021-06-30 ENCOUNTER — TELEPHONE (OUTPATIENT)
Dept: NEUROLOGY | Age: 21
End: 2021-06-30

## 2021-07-12 ENCOUNTER — OFFICE VISIT (OUTPATIENT)
Dept: INTERNAL MEDICINE CLINIC | Age: 21
End: 2021-07-12
Payer: COMMERCIAL

## 2021-07-12 ENCOUNTER — TELEPHONE (OUTPATIENT)
Dept: NEUROLOGY | Age: 21
End: 2021-07-12

## 2021-07-12 VITALS
BODY MASS INDEX: 26.17 KG/M2 | SYSTOLIC BLOOD PRESSURE: 120 MMHG | HEART RATE: 65 BPM | WEIGHT: 142.2 LBS | RESPIRATION RATE: 14 BRPM | TEMPERATURE: 98.9 F | DIASTOLIC BLOOD PRESSURE: 83 MMHG | HEIGHT: 62 IN | OXYGEN SATURATION: 97 %

## 2021-07-12 DIAGNOSIS — F41.9 ANXIETY: Primary | ICD-10-CM

## 2021-07-12 DIAGNOSIS — F32.A DEPRESSION, UNSPECIFIED DEPRESSION TYPE: ICD-10-CM

## 2021-07-12 DIAGNOSIS — G44.049 CHRONIC PAROXYSMAL HEMICRANIA, NOT INTRACTABLE: ICD-10-CM

## 2021-07-12 PROCEDURE — 99213 OFFICE O/P EST LOW 20 MIN: CPT | Performed by: INTERNAL MEDICINE

## 2021-07-12 RX ORDER — ALPRAZOLAM 0.5 MG/1
0.5 TABLET ORAL
Qty: 50 TABLET | Refills: 0 | Status: SHIPPED | OUTPATIENT
Start: 2021-07-12 | End: 2021-12-08 | Stop reason: SDUPTHER

## 2021-07-12 RX ORDER — TOPIRAMATE 25 MG/1
25 TABLET ORAL 2 TIMES DAILY
Qty: 60 TABLET | Status: SHIPPED | OUTPATIENT
Start: 2021-07-12 | End: 2022-08-01

## 2021-07-12 RX ORDER — VENLAFAXINE HYDROCHLORIDE 75 MG/1
75 CAPSULE, EXTENDED RELEASE ORAL DAILY
Qty: 30 CAPSULE | Status: SHIPPED | OUTPATIENT
Start: 2021-07-12 | End: 2021-08-04 | Stop reason: SDUPTHER

## 2021-07-12 NOTE — PATIENT INSTRUCTIONS

## 2021-07-12 NOTE — PROGRESS NOTES
HIPAA verified by two patient identifiers. Toro Lindsey is a 23 y.o. female    Chief Complaint   Patient presents with    Medication Evaluation    Medication Refill       Visit Vitals  /83 (BP 1 Location: Left upper arm, BP Patient Position: Sitting, BP Cuff Size: Adult)   Pulse 65   Temp 98.9 °F (37.2 °C) (Oral)   Resp 14   Ht 5' 2\" (1.575 m)   Wt 142 lb 3.2 oz (64.5 kg)   SpO2 97%   BMI 26.01 kg/m²       Pain Scale: 0 - No pain/10  Pain Location:       Health Maintenance Due   Topic Date Due    Hepatitis C Screening  Never done    HPV Age 9Y-34Y (1 - 2-dose series) Never done    COVID-19 Vaccine (1) Never done    PAP AKA CERVICAL CYTOLOGY  Never done         Coordination of Care Questionnaire:  :   1) Have you been to an emergency room, urgent care, or hospitalized since your last visit? If yes, where when, and reason for visit? Kid med  Last week for dehydration        2. Have seen or consulted any other health care provider since your last visit? If yes, where when, and reason for visit? NO      Patient is accompanied by self I have received verbal consent from Toro Lindsey to discuss any/all medical information while they are present in the room.

## 2021-07-12 NOTE — PROGRESS NOTES
Subjective:   Zhen Peoples is a 23 y.o. female      Chief Complaint   Patient presents with    Medication Evaluation    Medication Refill        History of present illness: She presents today to discuss her anxiety and depression as well as her headaches along with the medications. Since she last saw me she saw a neurologist and the neurologist did examine her and told her he thought her headaches were consistent with her migraine headaches. He prescribed [employer] as well as Trokendi XR neither which her insurance will cover. She still persists with headaches on a regular basis. She is taking the Lexapro which she thinks the Lexapro is making her anxiety worse. She previously took Prozac as well as Zoloft and she felt like they did the same thing. She is wondering if she can take something on a as needed basis for her anxiety with near panic attacks. She did take some Xanax back in high school but at that time she was also taken Adderall in the combination due to did not mix well together. She has no suicidal thoughts ideations.     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 G56.22    Chronic migraine without aura, not intractable, without status migrainosus G43.709    Tension vascular headache G44.209    Depression F32.9      Past Medical History:   Diagnosis Date    Anxiety     Depression     mild/ Anxiety    Palpitations       No Known Allergies   Family History   Problem Relation Age of Onset    No Known Problems Mother     No Known Problems Father     Asthma Sister     Diabetes Maternal Grandmother     Diabetes Maternal Grandfather     Diabetes Paternal Grandmother     Diabetes Paternal Grandfather       Social History     Socioeconomic History    Marital status: SINGLE     Spouse name: Not on file    Number of children: Not on file    Years of education: Not on file    Highest education level: Not on file   Occupational History    Not on file   Tobacco Use    Smoking status: Former Smoker     Packs/day: 0.25     Quit date: 2020     Years since quittin.6    Smokeless tobacco: Never Used    Tobacco comment: smokes 1 cigarette a day   Vaping Use    Vaping Use: Some days    Substances: Nicotine    Devices: Pre-filled pod   Substance and Sexual Activity    Alcohol use: No    Drug use: No    Sexual activity: Never     Birth control/protection: Injection   Other Topics Concern    Not on file   Social History Narrative    Not on file     Social Determinants of Health     Financial Resource Strain:     Difficulty of Paying Living Expenses:    Food Insecurity:     Worried About Running Out of Food in the Last Year:     [address] in the Last Year:    Transportation Needs:     Lack of Transportation (Medical):  Lack of Transportation (Non-Medical):    Physical Activity:     Days of Exercise per Week:     Minutes of Exercise per Session:    Stress:     Feeling of Stress :    Social Connections:     Frequency of Communication with Friends and Family:     Frequency of Social Gatherings with Friends and Family:     Attends Mosque Services:     Active Member of Clubs or Organizations:     Attends Club or Organization Meetings:     Marital Status:    Intimate Partner Violence:     Fear of Current or Ex-Partner:     Emotionally Abused:     Physically Abused:     Sexually Abused:      Prior to Admission medications    Medication Sig Start Date End Date Taking? Authorizing Provider   venlafaxine-SR [facility] 75 mg capsule Take 1 Capsule by mouth daily. 21  Yes Louise Peguero MD   ALPRAZolam Nicole Hernandez) 0.5 mg tablet Take 1 Tablet by mouth three (3) times daily as needed for Anxiety.  Max Daily Amount: 1.5 mg. 21  Yes Louise Peguero MD   topiramate (TOPAMAX) 25 mg tablet Take 1 Tablet by mouth two (2) times a day. 7/12/21  Yes Rell Myers MD   metoprolol tartrate (LOPRESSOR) 25 mg tablet 25 mg. Indications: takes as needed for heart palpitations  Indications: takes as needed for heart palpitations 3/8/21  Yes Rell Myers MD        Review of Systems              Constitutional:  She denies fever, weight loss, sweats or fatigue. EYES: No blurred or double vision,               ENT: no nasal congestion, no headache or dizziness. No difficulty with               swallowing, taste, speech or smell. Respiratory:  No cough, wheezing or shortness of breath. No sputum production. Cardiac:  Denies chest pain, palpitations, unexplained indigestion, syncope, edema, PND or orthopnea. GI:  No changes in bowel movements, no abdominal pain, no bloating, anorexia, nausea, vomiting or heartburn. :  No frequency or dysuria. Denies incontinence or sexual dysfunction. Extremities:  No joint pain, stiffness or swelling  Back:.no pain or soreness  Skin:  No recent rashes or mole changes. Neurological:  No numbness, tingling, burning paresthesias or loss of motor strength. No syncope, dizziness, or memory loss. Positive for frequent headaches  Psychiatric: Positive anxiety with near panic attacks and still depressed but not suicidal.  Hematologic:  No easy bruising  Lymphatic: No lymph node enlargement    Objective:     Vitals:    07/12/21 1512   BP: 120/83   Pulse: 65   Resp: 14   Temp: 98.9 °F (37.2 °C)   TempSrc: Oral   SpO2: 97%   Weight: 142 lb 3.2 oz (64.5 kg)   Height: 5' 2\" (1.575 m)   PainSc:   0 - No pain       Body mass index is 26.01 kg/m². Physical Examination:              General Appearance:  Well-developed, well-nourished, no acute distress. HEENT:      Ears:  The TMs and ear canals were clear. Eyes:  The pupillary responses were normal.  Extraocular muscle function intact. Lids and conjunctiva not injected. Funduscopic exam revealed sharp disc margins.   Nares: Clear w/o edema or erythema  Pharynx:  Clear with teeth in good repair. No masses were noted. Neck:  Supple without thyromegaly or adenopathy. No JVD noted. No carotid                bruits. Lungs:  Clear to auscultation and percussion. Cardiac:  Regular rate and rhythm without murmur. PMI not displaced. No gallop, rub or click. Abdominal: Soft, non-tender, no hepata-spleenomegally or masses  Extremities:  No clubbing, cyanosis or edema. Skin:  No rash or unusual mole changes noted. Lymph Nodes:  None felt in the cervical, supraclavicular, axillary or inguinal region. Neurological: . DTRs 2+ and symmetric. Station and gait normal.   Hematologic:   No purpura or petechiae        Assessment/Plan:         1. Anxiety    2. Depression, unspecified depression type    3. Chronic paroxysmal hemicrania, not intractable        Impressions/Plan:  Impression  1. Anxiety we will try Xanax 0.5 TID as needed #50 given. 2.  Depression I will try switching to Effexor XR 75 daily  3. Paroxysmal headaches consistent with migraines we will try Topamax generic 25 mg twice daily and we can titrate up if needed. Tentative follow-up set up for 3 months or sooner should it be a problem. Orders Placed This Encounter    venlafaxine-SR (EFFEXOR-XR) 75 mg capsule    ALPRAZolam (XANAX) 0.5 mg tablet    topiramate (TOPAMAX) 25 mg tablet       Follow-up and Dispositions    · Return in about 3 months (around 10/12/2021). No results found for any visits on 07/12/21. Mey Garcia MD    The patient was given after the visit summary the patient verbalized an understanding of the plans and problems as explained.

## 2021-07-13 ENCOUNTER — TELEPHONE (OUTPATIENT)
Dept: NEUROLOGY | Age: 21
End: 2021-07-13

## 2021-07-13 NOTE — TELEPHONE ENCOUNTER
Nic Harper and Trokendi PA's submitted online to [employer] as prescribed by Dr. Bryant Maynard. These were discontinued by Dr. Braeden Castrejon. Status pending. (perhaps they were discontinued pending authorization as Dr. Daniela Oliveira noted patient was not taking).

## 2021-07-16 ENCOUNTER — TELEPHONE (OUTPATIENT)
Dept: NEUROLOGY | Age: 21
End: 2021-07-16

## 2021-07-16 NOTE — TELEPHONE ENCOUNTER
Response from [employer] that Trokendi is plan exclusion - any drug on Tier 2 or 3 is excluded. I called [employer] to find out what drugs are on their Tier 1 instead. And to find out status of Ubrelvy.

## 2021-07-26 ENCOUNTER — APPOINTMENT (OUTPATIENT)
Dept: CT IMAGING | Age: 21
End: 2021-07-26
Attending: EMERGENCY MEDICINE
Payer: COMMERCIAL

## 2021-07-26 ENCOUNTER — HOSPITAL ENCOUNTER (EMERGENCY)
Age: 21
Discharge: HOME | End: 2021-07-26
Attending: EMERGENCY MEDICINE
Payer: COMMERCIAL

## 2021-07-26 ENCOUNTER — APPOINTMENT (OUTPATIENT)
Dept: MRI IMAGING | Age: 21
End: 2021-07-26
Attending: EMERGENCY MEDICINE
Payer: COMMERCIAL

## 2021-07-26 ENCOUNTER — APPOINTMENT (OUTPATIENT)
Dept: ULTRASOUND IMAGING | Age: 21
End: 2021-07-26
Payer: COMMERCIAL

## 2021-07-26 ENCOUNTER — APPOINTMENT (OUTPATIENT)
Dept: GENERAL RADIOLOGY | Age: 21
End: 2021-07-26
Payer: COMMERCIAL

## 2021-07-26 VITALS
BODY MASS INDEX: 25.4 KG/M2 | TEMPERATURE: 98.8 F | RESPIRATION RATE: 20 BRPM | OXYGEN SATURATION: 100 % | DIASTOLIC BLOOD PRESSURE: 91 MMHG | SYSTOLIC BLOOD PRESSURE: 132 MMHG | HEIGHT: 62 IN | HEART RATE: 121 BPM | WEIGHT: 138.01 LBS

## 2021-07-26 DIAGNOSIS — R20.0 RIGHT LEG NUMBNESS: Primary | ICD-10-CM

## 2021-07-26 LAB
ALBUMIN SERPL-MCNC: 4.4 G/DL (ref 3.5–5)
ALBUMIN/GLOB SERPL: 1.1 {RATIO} (ref 1.1–2.2)
ALP SERPL-CCNC: 48 U/L (ref 45–117)
ALT SERPL-CCNC: 18 U/L (ref 12–78)
ANION GAP SERPL CALC-SCNC: 7 MMOL/L (ref 5–15)
AST SERPL-CCNC: 15 U/L (ref 15–37)
BASOPHILS # BLD: 0.1 K/UL (ref 0–0.1)
BASOPHILS NFR BLD: 1 % (ref 0–1)
BILIRUB SERPL-MCNC: 0.3 MG/DL (ref 0.2–1)
BUN SERPL-MCNC: 15 MG/DL (ref 6–20)
BUN/CREAT SERPL: 18 (ref 12–20)
CALCIUM SERPL-MCNC: 8.8 MG/DL (ref 8.5–10.1)
CHLORIDE SERPL-SCNC: 108 MMOL/L (ref 97–108)
CO2 SERPL-SCNC: 26 MMOL/L (ref 21–32)
COMMENT, HOLDF: NORMAL
CREAT SERPL-MCNC: 0.85 MG/DL (ref 0.55–1.02)
DIFFERENTIAL METHOD BLD: NORMAL
EOSINOPHIL # BLD: 0.1 K/UL (ref 0–0.4)
EOSINOPHIL NFR BLD: 1 % (ref 0–7)
ERYTHROCYTE [DISTWIDTH] IN BLOOD BY AUTOMATED COUNT: 12.4 % (ref 11.5–14.5)
GLOBULIN SER CALC-MCNC: 3.9 G/DL (ref 2–4)
GLUCOSE SERPL-MCNC: 122 MG/DL (ref 65–100)
HCT VFR BLD AUTO: 41.2 % (ref 35–47)
HGB BLD-MCNC: 14 G/DL (ref 11.5–16)
IMM GRANULOCYTES # BLD AUTO: 0 K/UL (ref 0–0.04)
IMM GRANULOCYTES NFR BLD AUTO: 0 % (ref 0–0.5)
LYMPHOCYTES # BLD: 2.2 K/UL (ref 0.8–3.5)
LYMPHOCYTES NFR BLD: 38 % (ref 12–49)
MAGNESIUM SERPL-MCNC: 1.7 MG/DL (ref 1.6–2.4)
MCH RBC QN AUTO: 31.2 PG (ref 26–34)
MCHC RBC AUTO-ENTMCNC: 34 G/DL (ref 30–36.5)
MCV RBC AUTO: 91.8 FL (ref 80–99)
MONOCYTES # BLD: 0.5 K/UL (ref 0–1)
MONOCYTES NFR BLD: 8 % (ref 5–13)
NEUTS SEG # BLD: 3 K/UL (ref 1.8–8)
NEUTS SEG NFR BLD: 52 % (ref 32–75)
NRBC # BLD: 0 K/UL (ref 0–0.01)
NRBC BLD-RTO: 0 PER 100 WBC
PLATELET # BLD AUTO: 265 K/UL (ref 150–400)
PMV BLD AUTO: 10.4 FL (ref 8.9–12.9)
POTASSIUM SERPL-SCNC: 3.8 MMOL/L (ref 3.5–5.1)
PROT SERPL-MCNC: 8.3 G/DL (ref 6.4–8.2)
RBC # BLD AUTO: 4.49 M/UL (ref 3.8–5.2)
SAMPLES BEING HELD,HOLD: NORMAL
SODIUM SERPL-SCNC: 141 MMOL/L (ref 136–145)
TROPONIN I SERPL-MCNC: <0.05 NG/ML
WBC # BLD AUTO: 5.8 K/UL (ref 3.6–11)

## 2021-07-26 PROCEDURE — 93971 EXTREMITY STUDY: CPT

## 2021-07-26 PROCEDURE — 71046 X-RAY EXAM CHEST 2 VIEWS: CPT

## 2021-07-26 PROCEDURE — 80053 COMPREHEN METABOLIC PANEL: CPT

## 2021-07-26 PROCEDURE — A9576 INJ PROHANCE MULTIPACK: HCPCS | Performed by: EMERGENCY MEDICINE

## 2021-07-26 PROCEDURE — 70450 CT HEAD/BRAIN W/O DYE: CPT

## 2021-07-26 PROCEDURE — 36415 COLL VENOUS BLD VENIPUNCTURE: CPT

## 2021-07-26 PROCEDURE — 74011636320 HC RX REV CODE- 636/320: Performed by: EMERGENCY MEDICINE

## 2021-07-26 PROCEDURE — 85025 COMPLETE CBC W/AUTO DIFF WBC: CPT

## 2021-07-26 PROCEDURE — 70553 MRI BRAIN STEM W/O & W/DYE: CPT

## 2021-07-26 PROCEDURE — 93005 ELECTROCARDIOGRAM TRACING: CPT

## 2021-07-26 PROCEDURE — 84484 ASSAY OF TROPONIN QUANT: CPT

## 2021-07-26 PROCEDURE — 83735 ASSAY OF MAGNESIUM: CPT

## 2021-07-26 PROCEDURE — 99284 EMERGENCY DEPT VISIT MOD MDM: CPT

## 2021-07-26 RX ADMIN — GADOTERIDOL 13 ML: 279.3 INJECTION, SOLUTION INTRAVENOUS at 22:02

## 2021-07-27 LAB
ATRIAL RATE: 91 BPM
CALCULATED P AXIS, ECG09: 46 DEGREES
CALCULATED R AXIS, ECG10: 38 DEGREES
CALCULATED T AXIS, ECG11: 3 DEGREES
DIAGNOSIS, 93000: NORMAL
P-R INTERVAL, ECG05: 118 MS
Q-T INTERVAL, ECG07: 336 MS
QRS DURATION, ECG06: 84 MS
QTC CALCULATION (BEZET), ECG08: 413 MS
VENTRICULAR RATE, ECG03: 91 BPM

## 2021-07-27 NOTE — ED PROVIDER NOTES
EMERGENCY DEPARTMENT HISTORY AND PHYSICAL EXAM      Date: 7/26/2021  Patient Name: Stepehn Myers    History of Presenting Illness     Chief Complaint   Patient presents with    Chest Pain     Chest tightness for about 45 minutes.  Leg Swelling     Leg swelling and tingling for about 2 weeks with pain. History Provided By: Patient and Patient's Mother    HPI: Stephen Myers, 23 y.o. female presents to the ED with cc of leg paresthesias. 18-year-old female with a past medical history of anxiety and migraines presents emergency department with right leg paresthesias. Patient follows with neurology, Dr. Madi Salazar for migraines. Patient reports right leg symptoms started approximately 2 weeks ago. 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 She reports symptoms began with \"pins-and-needles\" in her right calf. She reports they migrated proximally up her right thigh. She reports her right leg feels heavy she has trouble driving at work. She denies any noticeable weakness or difficulty walking but states she \"notices her leg more. Denies any midline back pain, bowel or bladder incontinence or saddle anesthesia. Patient reports this morning she woke up and felt bilateral neck numbness. This felt like decree sensation of the skin, it improved with time. Denies any neck pain. Denies headaches, abdominal pain, vomiting or diarrhea. Of note, patient states she has had intermittent chest pain in the past.  Triage complaint states chest pain, she states she believes this is \"anxiety. \"  Reports intermittent episodes of chest pain. She has an IUD. Denies any shortness of breath or history of DVT or PE. There are no other complaints, changes, or physical findings at this time. PCP: Carola Pearson MD    No current facility-administered medications on file prior to encounter.      Current Outpatient Medications on File Prior to Encounter   Medication Sig Dispense Refill    venlafaxine-SR (EFFEXOR-XR) 75 mg capsule Take 1 Capsule by mouth daily. 30 Capsule prn    ALPRAZolam (XANAX) 0.5 mg tablet Take 1 Tablet by mouth three (3) times daily as needed for Anxiety. Max Daily Amount: 1.5 mg. 50 Tablet 0    topiramate (TOPAMAX) 25 mg tablet Take 1 Tablet by mouth two (2) times a day. 60 Tablet prn    metoprolol tartrate (LOPRESSOR) 25 mg tablet 25 mg. Indications: takes as needed for heart palpitations  Indications: takes as needed for heart palpitations 30 Tab prn       Past History     Past Medical History:  Past Medical History:   Diagnosis Date    Anxiety     Depression     mild/ Anxiety    Palpitations        Past Surgical History:  No past surgical history on file. Family History:  Family History   Problem Relation Age of Onset    No Known Problems Mother     No Known Problems Father     Asthma Sister     Diabetes Maternal Grandmother     Diabetes Maternal Grandfather     Diabetes Paternal Grandmother     Diabetes Paternal Grandfather        Social History:  Social History     Tobacco Use    Smoking status: Former Smoker     Packs/day: 0.25     Quit date: 2020     Years since quittin.7    Smokeless tobacco: Never Used    Tobacco comment: smokes 1 cigarette a day   Vaping Use    Vaping Use: Some days    Substances: Nicotine    Devices: Pre-filled pod   Substance Use Topics    Alcohol use: No    Drug use: No       Allergies:  No Known Allergies      Review of Systems   Review of Systems   Constitutional: Negative for activity change, chills and fever. HENT: Negative for facial swelling and voice change. Eyes: Negative for redness. Respiratory: Negative for cough, shortness of breath and wheezing. Cardiovascular: Positive for chest pain. Negative for leg swelling. Gastrointestinal: Negative for abdominal pain, diarrhea, nausea and vomiting. Genitourinary: Negative for decreased urine volume. Musculoskeletal: Negative for gait problem. Skin: Negative for pallor and rash. Neurological: Positive for numbness. Negative for tremors, seizures, facial asymmetry, speech difficulty and weakness. Psychiatric/Behavioral: Negative for agitation. All other systems reviewed and are negative. Physical Exam   Physical Exam  Vitals and nursing note reviewed. Constitutional:       Comments: 79-year-old female, resting bed, no acute distress   HENT:      Head: Normocephalic and atraumatic. Cardiovascular:      Rate and Rhythm: Normal rate and regular rhythm. Pulses:           Radial pulses are 2+ on the right side and 2+ on the left side. Heart sounds: No murmur heard. No friction rub. No gallop. Pulmonary:      Effort: Pulmonary effort is normal.      Breath sounds: Normal breath sounds. Abdominal:      Palpations: Abdomen is soft. Tenderness: There is no abdominal tenderness. Musculoskeletal:         General: Normal range of motion. Cervical back: Normal range of motion. Right lower leg: No edema. Left lower leg: No edema. Skin:     General: Skin is warm. Capillary Refill: Capillary refill takes less than 2 seconds. Neurological:      Mental Status: She is alert. Comments: Awake and oriented. Cranial nerves II through XII intact. 5 out of 5 strength in upper and lower extremities. No ataxia. Decree sensation of her right lower extremity. Good pulses.    Psychiatric:         Mood and Affect: Mood normal.         Diagnostic Study Results     Labs -     Recent Results (from the past 12 hour(s))   EKG, 12 LEAD, INITIAL    Collection Time: 07/26/21  5:07 PM   Result Value Ref Range    Ventricular Rate 91 BPM    Atrial Rate 91 BPM    P-R Interval 118 ms    QRS Duration 84 ms    Q-T Interval 336 ms    QTC Calculation (Bezet) 413 ms    Calculated P Axis 46 degrees    Calculated R Axis 38 degrees    Calculated T Axis 3 degrees    Diagnosis       Normal sinus rhythm with sinus arrhythmia  Nonspecific ST and T wave abnormality  No previous ECGs available     CBC WITH AUTOMATED DIFF    Collection Time: 07/26/21  8:25 PM   Result Value Ref Range    WBC 5.8 3.6 - 11.0 K/uL    RBC 4.49 3.80 - 5.20 M/uL    HGB 14.0 11.5 - 16.0 g/dL    HCT 41.2 35.0 - 47.0 %    MCV 91.8 80.0 - 99.0 FL    MCH 31.2 26.0 - 34.0 PG    MCHC 34.0 30.0 - 36.5 g/dL    RDW 12.4 11.5 - 14.5 %    PLATELET 224 239 - 659 K/uL    MPV 10.4 8.9 - 12.9 FL    NRBC 0.0 0  WBC    ABSOLUTE NRBC 0.00 0.00 - 0.01 K/uL    NEUTROPHILS 52 32 - 75 %    LYMPHOCYTES 38 12 - 49 %    MONOCYTES 8 5 - 13 %    EOSINOPHILS 1 0 - 7 %    BASOPHILS 1 0 - 1 %    IMMATURE GRANULOCYTES 0 0.0 - 0.5 %    ABS. NEUTROPHILS 3.0 1.8 - 8.0 K/UL    ABS. LYMPHOCYTES 2.2 0.8 - 3.5 K/UL    ABS. MONOCYTES 0.5 0.0 - 1.0 K/UL    ABS. EOSINOPHILS 0.1 0.0 - 0.4 K/UL    ABS. BASOPHILS 0.1 0.0 - 0.1 K/UL    ABS. IMM. GRANS. 0.0 0.00 - 0.04 K/UL    DF AUTOMATED     TROPONIN I    Collection Time: 07/26/21  8:25 PM   Result Value Ref Range    Troponin-I, Qt. <0.05 <1.02 ng/mL   METABOLIC PANEL, COMPREHENSIVE    Collection Time: 07/26/21  8:25 PM   Result Value Ref Range    Sodium 141 136 - 145 mmol/L    Potassium 3.8 3.5 - 5.1 mmol/L    Chloride 108 97 - 108 mmol/L    CO2 26 21 - 32 mmol/L    Anion gap 7 5 - 15 mmol/L    Glucose 122 (H) 65 - 100 mg/dL    BUN 15 6 - 20 MG/DL    Creatinine 0.85 0.55 - 1.02 MG/DL    BUN/Creatinine ratio 18 12 - 20      GFR est AA >60 >60 ml/min/1.73m2    GFR est non-AA >60 >60 ml/min/1.73m2    Calcium 8.8 8.5 - 10.1 MG/DL    Bilirubin, total 0.3 0.2 - 1.0 MG/DL    ALT (SGPT) 18 12 - 78 U/L    AST (SGOT) 15 15 - 37 U/L    Alk.  phosphatase 48 45 - 117 U/L    Protein, total 8.3 (H) 6.4 - 8.2 g/dL    Albumin 4.4 3.5 - 5.0 g/dL    Globulin 3.9 2.0 - 4.0 g/dL    A-G Ratio 1.1 1.1 - 2.2     MAGNESIUM    Collection Time: 07/26/21  8:25 PM   Result Value Ref Range    Magnesium 1.7 1.6 - 2.4 mg/dL   SAMPLES BEING HELD    Collection Time: 07/26/21  8:25 PM   Result Value Ref Range    SAMPLES BEING HELD 1BLUE     COMMENT        Add-on orders for these samples will be processed based on acceptable specimen integrity and analyte stability, which may vary by analyte. Radiologic Studies -   MRI BRAIN W WO CONT   Final Result   Small pineal cyst.   There is no intracranial mass, hemorrhage or evidence of acute infarction. No acute intracranial process is demonstrated. CT HEAD WO CONT   Final Result         No acute abnormality      XR CHEST PA LAT   Final Result   1. No acute cardiopulmonary disease. Oval well-circumscribed nodule in the left   upper lobe given patient's age correlation with previous radiographs would be   helpful         DUPLEX LOWER EXT VENOUS RIGHT   Final Result        CT Results  (Last 48 hours)               07/26/21 2038  CT HEAD WO CONT Final result    Impression:          No acute abnormality       Narrative:  EXAM: CT HEAD WO CONT       INDICATION: R leg numbness       COMPARISON: None. CONTRAST: None. TECHNIQUE: Unenhanced CT of the head was performed using 5 mm images. Brain and   bone windows were generated. Coronal and sagittal reformats. CT dose reduction   was achieved through use of a standardized protocol tailored for this   examination and automatic exposure control for dose modulation. FINDINGS:   The ventricles and sulci are normal in size, shape and configuration. . There is   no significant white matter disease. There is no intracranial hemorrhage,   extra-axial collection, or mass effect. The basilar cisterns are open. No CT   evidence of acute infarct. The bone windows demonstrate no abnormalities. The visualized portions of the   paranasal sinuses and mastoid air cells are clear. CXR Results  (Last 48 hours)               07/26/21 1831  XR CHEST PA LAT Final result    Impression:  1. No acute cardiopulmonary disease.  Oval well-circumscribed nodule in the left   upper lobe given patient's age correlation with previous radiographs would be   helpful           Narrative:  INDICATION:  chest tightness and sob        Exam: Chest 2 views. Comparison: None. Findings: Cardiomediastinal silhouette is normal. Pulmonary vasculature is not   engorged. No consolidation. Oval well-circumscribed 10 mm nodule left upper   lobe. No pneumothorax or effusion. Bony thorax is intact. Medical Decision Making   I am the first provider for this patient. I reviewed the vital signs, available nursing notes, past medical history, past surgical history, family history and social history. Vital Signs-Reviewed the patient's vital signs. Patient Vitals for the past 12 hrs:   Temp Pulse Resp BP SpO2   07/26/21 1704 98.8 °F (37.1 °C) (!) 121 20 (!) 132/91 100 %       EKG interpretation: (Preliminary)    Preliminary EKG interpreted by me. Shows normal sinus rhythm with a HR of 91. No ST elevations or depressions concerning for ischemia. Normal intervals. Records Reviewed: Nursing Notes and Old Medical Records    Provider Notes (Medical Decision Making):     69-year-old female presents with multiple complaints. Primarily she is concerned regarding right leg paresthesias and decreased sensation. She has no other hard neuro deficits, other than bilateral neck paresthesias this morning which resolved. No Code S given duration of symptoms, unusual for central process given anatomic lesions. Differentials broad, includes peripheral process, no back pain to suspect cauda equina, myelinating lesion, less likely stroke. Regarding chest pain check EKG and troponin, this appears to be a more chronic complaint,  duplex was performed in triage which was negative given patient's initial presenting complaint. Discussed with Dr. Binta You from neurology via telephone, recommends MRI with and without, disposition for that. ED Course:   Initial assessment performed.  The patients presenting problems have been discussed, and they are in agreement with the care plan formulated and outlined with them. I have encouraged them to ask questions as they arise throughout their visit. ED Course as of Jul 26 2323 Mon Jul 26, 2021 2139 Labs are reassuring including troponin which is negative. [MB]   6754 CT and MRI negative. [MB]   4034 Patient informed regarding incidental nodule on chest x-ray. Informed regarding pineal cyst, advised regarding neurology follow-up. [MB]      ED Course User Index  [MB] MD Mahogany Montelongo MD      Disposition:    Discharged    DISCHARGE PLAN:  1. Current Discharge Medication List        2. Follow-up Information     Follow up With Specialties Details Why Contact Info    Deirdre Haque MD Internal Medicine In 3 days  [facility] [address]  [address] [phone]      Neema Vega MD Neurology In 1 week  [address] [place] [address] Drive  [address] [facility]  [address] [phone]          3. Return to ED if worse     Diagnosis     Clinical Impression:   1. Right leg numbness        Attestations:    Mahogany Patel MD    Please note that this dictation was completed with "[employer] the [employer] voice recognition software. Quite often unanticipated grammatical, syntax, homophones, and other interpretive errors are inadvertently transcribed by the computer software. Please disregard these errors. Please excuse any errors that have escaped final proofreading. Thank you.

## 2021-07-27 NOTE — DISCHARGE INSTRUCTIONS
Please call Dr. Rachel Mondragon for a follow-up appointment for a possible EMG or nerve study. Your MRI did not show a stroke or MS, did show a small cyst on your pineal gland. Your CXR showed a small lung nodule, please follow-up with your primary care doctor for a repeat xray in 2-3 months.

## 2021-07-28 ENCOUNTER — TELEPHONE (OUTPATIENT)
Dept: NEUROLOGY | Age: 21
End: 2021-07-28

## 2021-07-28 NOTE — TELEPHONE ENCOUNTER
----- Message from Jason Joiner sent at 7/28/2021  9:22 AM EDT -----  Regarding: Dr. Leny Storm  General Message/Vendor Calls    Caller's first and last name: Pt      Reason for call: nerve study      Callback required yes/no and why: Yes      Best contact number(s): [phone]      Details to clarify the request: Pt was seen in the ER on 07/26 and was told she would need to have a nerve study completed. She didn't know if this was something Dr. Princess Harris office is supposed to schedule or the hospital. Please advise.        Jason Joiner

## 2021-07-29 NOTE — TELEPHONE ENCOUNTER
I called the patient and notified her that looking at the note, it seems she should have an exam before deciding on and EMG.  I moved her appointment up as I was able and will hold in case I have a cancellation

## 2021-08-04 RX ORDER — VENLAFAXINE HYDROCHLORIDE 75 MG/1
75 CAPSULE, EXTENDED RELEASE ORAL DAILY
Qty: 90 CAPSULE | Refills: 1 | Status: SHIPPED | OUTPATIENT
Start: 2021-08-04 | End: 2021-11-01 | Stop reason: SDUPTHER

## 2021-08-04 NOTE — TELEPHONE ENCOUNTER
RX refill request from the patient/pharmacy. Patient last seen 07- with labs, and next appt. scheduled for 08-  Requested Prescriptions     Pending Prescriptions Disp Refills    venlafaxine-SR (EFFEXOR-XR) 75 mg capsule 90 Capsule 1     Sig: Take 1 Capsule by mouth daily. Ron Gomez

## 2021-08-24 ENCOUNTER — OFFICE VISIT (OUTPATIENT)
Dept: NEUROLOGY | Age: 21
End: 2021-08-24
Payer: COMMERCIAL

## 2021-08-24 VITALS
RESPIRATION RATE: 16 BRPM | WEIGHT: 136 LBS | BODY MASS INDEX: 25.03 KG/M2 | HEART RATE: 84 BPM | TEMPERATURE: 98.6 F | DIASTOLIC BLOOD PRESSURE: 64 MMHG | HEIGHT: 62 IN | OXYGEN SATURATION: 98 % | SYSTOLIC BLOOD PRESSURE: 116 MMHG

## 2021-08-24 DIAGNOSIS — G43.709 CHRONIC MIGRAINE WITHOUT AURA, NOT INTRACTABLE, WITHOUT STATUS MIGRAINOSUS: ICD-10-CM

## 2021-08-24 DIAGNOSIS — G56.22 NEUROPATHY OF LEFT ULNAR NERVE AT WRIST: ICD-10-CM

## 2021-08-24 DIAGNOSIS — R20.2 PARESTHESIA OF RIGHT LEG: Primary | ICD-10-CM

## 2021-08-24 DIAGNOSIS — R20.2 PARESTHESIA OF RIGHT LEG: ICD-10-CM

## 2021-08-24 DIAGNOSIS — R29.2 HYPERREFLEXIA: ICD-10-CM

## 2021-08-24 LAB — VIT B12 SERPL-MCNC: 262 PG/ML (ref 193–986)

## 2021-08-24 PROCEDURE — 99215 OFFICE O/P EST HI 40 MIN: CPT | Performed by: PSYCHIATRY & NEUROLOGY

## 2021-08-24 RX ORDER — TIZANIDINE 4 MG/1
4 TABLET ORAL
COMMUNITY
Start: 2021-08-17 | End: 2022-06-19

## 2021-08-24 NOTE — ASSESSMENT & PLAN NOTE
symptoms presently completely resolved but unclear etiology location suggest a thoracic spinal pathology. Given the temporal nature and progression and resolution of symptoms 1 has to seriously consider a partial transverse myelitis or demyelinating lesion. MRI of the thoracic spine will be ordered to evaluate for this or other structural process that may have contributed to symptoms. Patient also has a right upgoing toe    We will also look at additional blood work to evaluate if any infectious or inflammatory or metabolic processes are contributory to symptoms.

## 2021-08-24 NOTE — ASSESSMENT & PLAN NOTE
stable without any type of neurologic intervention at this time. Can consider EMGs. But I do think this is a completely separate issue from that related to her right leg weakness.

## 2021-08-24 NOTE — LETTER
8/24/2021    Patient: Pebbles Conroy   YOB: 2000   Date of Visit: 8/24/2021     Grey Tubbs MD  [address]  [address] [facility] [address]    Dear Grey Tubbs MD,      Thank you for referring Ms. Chirag Haley to [address] [place] [address] for evaluation. My notes for this consultation are attached. If you have questions, please do not hesitate to call me. I look forward to following your patient along with you.       Sincerely,    Tigist Dykes NP

## 2021-08-24 NOTE — PROGRESS NOTES
[address] 82  In Office FOLLOW-UP VISIT         Stephen Myers is a 23 y.o. female who presents today for the following:  Chief Complaint   Patient presents with    Follow-up    Abnormal MRI         ASSESSMENT AND PLAN  Patient is known to this practice. This is my first time seeing the patient. Chart and history reviewed in detail at today's office visit. 1. Paresthesia of right leg  Assessment & Plan:    symptoms presently completely resolved but unclear etiology location suggest a thoracic spinal pathology. Given the temporal nature and progression and resolution of symptoms 1 has to seriously consider a partial transverse myelitis or demyelinating lesion. MRI of the thoracic spine will be ordered to evaluate for this or other structural process that may have contributed to symptoms. Patient also has a right upgoing toe    We will also look at additional blood work to evaluate if any infectious or inflammatory or metabolic processes are contributory to symptoms. Orders:  -     MRI [facility] SPINE W WO CONT; Future  -     TSH 3RD GENERATION  -     VITAMIN B12; Future  -     LYME AB, IGG & IGM BY WB; Future  -     ZEN COMPREHENSIVE PLUS PANEL; Future  2. Hyperreflexia  -     MRI [facility] SPINE W WO CONT; Future  3. Chronic migraine without aura, not intractable, without status migrainosus  Assessment & Plan:   well controlled, continue current medications  4. 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 Can consider EMGs. But I do think this is a completely separate issue from that related to her right leg weakness. Patient and/or family was given time to ask questions and voice concerns. I believe all questions concerns were adequately addressed at this  office visit.   Patient and/or family also verbalized agreement and understanding of the above-stated plan    Patient remains a complex patient secondary to polypharmacy, significant comorbid conditions, and use of high-risk medications which complicate the decision making process related to patient's neurologic diagnosis    Follow-up and Dispositions    · Return in about 3 months (around 11/24/2021) for In office appointment. ICD-10-CM ICD-9-CM    1. Paresthesia of right leg  R20.2 782.0 MRI [facility] SPINE W WO CONT      TSH 3RD GENERATION      VITAMIN B12      LYME AB, IGG & IGM BY WB      ZEN COMPREHENSIVE PLUS PANEL   2. Hyperreflexia  R29.2 796.1 MRI [facility] SPINE W WO CONT   3. Chronic migraine without aura, not intractable, without status migrainosus  G43.709 346.70    4. 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       HPI  Historical Data  Patient is known to the practice and was previously seen by Dr Toi Quinonez    Neurologic diagnosis  Headache and Migraines   Location: bitemporal   Quality: pressure   Associated Sx: + L/S sensitivity, +N/-V, blurred vision   Other: Hx of concussions      Ulnar neuropathy: Left    New symptoms 7/26/2021 sensory loss:  Rt waist down through ankle  Complete feeling intact in foot  Last 3 days from hip down but 2 week from knee down  No change in B/B  No motor impairment  Denies any other neurologic symptom          Other significant comorbid conditions/concerns  Anxiety and depression    Other:  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 [employer]    Interim Data:     Migraines  Much improved on topamax  Denies side effects    Paresthesias with sensory level at waist on right side down right leg  Has completely resolved  New complaint at today's office visit of 8/24/2021  See details under historical data  Patient does believe that perhaps her job with consistent driving is playing into this as she has started this new job which requires a great deal of driving prior to the onset of the symptoms                Pertinent diagnostic data      [facility]    MRI BRAIN W WO CONT    Impression  Small pineal cyst.  There is no intracranial mass, hemorrhage or evidence of acute infarction. No acute intracranial process is demonstrated. DUPLEX LOWER EXT VENOUS RIGHT (Final result)  Result time 07/26/21 18:12:15  Final result by Vamshi Cobb MD (07/26/21 18:12:15)                Narrative:    · No evidence of acute deep vein thrombosis in the right common femoral,   superficial femoral, popliteal, posterior tibial, and peroneal veins. The   veins were imaged in the transverse and longitudinal planes. The vessels   showed normal color filling and compressibility. Doppler interrogation   showed phasic and spontaneous flow. · No evidence of deep vein thrombosis in the right lower extremity.                Admission on 07/26/2021, Discharged on 07/26/2021   Component Date Value Ref Range Status    Ventricular Rate 07/26/2021 91  BPM Final    Atrial Rate 07/26/2021 91  BPM Final    P-R Interval 07/26/2021 118  ms Final    QRS Duration 07/26/2021 84  ms Final    Q-T Interval 07/26/2021 336  ms Final    QTC Calculation (Bezet) 07/26/2021 413  ms Final    Calculated P Axis 07/26/2021 46  degrees Final    Calculated R Axis 07/26/2021 38  degrees Final    Calculated T Axis 07/26/2021 3  degrees Final    Diagnosis 07/26/2021    Final                    Value:Normal sinus rhythm with sinus arrhythmia  Nonspecific ST and T wave abnormality  No previous ECGs available  Confirmed by Suzy Fernandez M.D. ([id number]) on 7/27/2021 11:15:06 AM      WBC 07/26/2021 5.8  3.6 - 11.0 K/uL Final    RBC 07/26/2021 4.49  3.80 - 5.20 M/uL Final    HGB 07/26/2021 14.0  11.5 - 16.0 g/dL Final    HCT 07/26/2021 41.2  35.0 - 47.0 % Final    MCV 07/26/2021 91.8  80.0 - 99.0 FL Final    MCH 07/26/2021 31.2  26.0 - 34.0 PG Final    MCHC 07/26/2021 34.0  30.0 - 36.5 g/dL Final    RDW 07/26/2021 12.4  11.5 - 14.5 % Final    PLATELET 63/88/7463 469  150 - 400 K/uL Final    MPV 07/26/2021 10.4  8.9 - 12.9 FL Final    NRBC 07/26/2021 0.0  0  WBC Final    ABSOLUTE NRBC 07/26/2021 0.00  0.00 - 0.01 K/uL Final    NEUTROPHILS 07/26/2021 52  32 - 75 % Final    LYMPHOCYTES 07/26/2021 38  12 - 49 % Final    MONOCYTES 07/26/2021 8  5 - 13 % Final    EOSINOPHILS 07/26/2021 1  0 - 7 % Final    BASOPHILS 07/26/2021 1  0 - 1 % Final    IMMATURE GRANULOCYTES 07/26/2021 0  0.0 - 0.5 % Final    ABS. NEUTROPHILS 07/26/2021 3.0  1.8 - 8.0 K/UL Final    ABS. LYMPHOCYTES 07/26/2021 2.2  0.8 - 3.5 K/UL Final    ABS. MONOCYTES 07/26/2021 0.5  0.0 - 1.0 K/UL Final    ABS. EOSINOPHILS 07/26/2021 0.1  0.0 - 0.4 K/UL Final    ABS. BASOPHILS 07/26/2021 0.1  0.0 - 0.1 K/UL Final    ABS. IMM.  GRANS. 07/26/2021 0.0  0.00 - 0.04 K/UL Final    DF 07/26/2021 AUTOMATED    Final    Troponin-I, Qt. 07/26/2021 <0.05  <0.05 ng/mL Final    Sodium 07/26/2021 141  136 - 145 mmol/L Final    Potassium 07/26/2021 3.8  3.5 - 5.1 mmol/L Final    Chloride 07/26/2021 108  97 - 108 mmol/L Final    CO2 07/26/2021 26  21 - 32 mmol/L Final    Anion gap 07/26/2021 7  5 - 15 mmol/L Final    Glucose 07/26/2021 122* 65 - 100 mg/dL Final    BUN 07/26/2021 15  6 - 20 MG/DL Final    Creatinine 07/26/2021 0.85  0.55 - 1.02 MG/DL Final    BUN/Creatinine ratio 07/26/2021 18  12 - 20   Final    GFR est AA 07/26/2021 >60  >60 ml/min/1.73m2 Final    GFR est non-AA 07/26/2021 >60  >60 ml/min/1.73m2 Final    Calcium 07/26/2021 8.8  8.5 - 10.1 MG/DL Final    Bilirubin, total 07/26/2021 0.3  0.2 - 1.0 MG/DL Final    ALT (SGPT) 07/26/2021 18  12 - 78 U/L Final    AST (SGOT) 07/26/2021 15  15 - 37 U/L Final    Alk. phosphatase 07/26/2021 48  45 - 117 U/L Final    Protein, total 07/26/2021 8.3* 6.4 - 8.2 g/dL Final    Albumin 07/26/2021 4.4  3.5 - 5.0 g/dL Final    Globulin 07/26/2021 3.9  2.0 - 4.0 g/dL Final    A-G Ratio 07/26/2021 1.1  1.1 - 2.2   Final    Magnesium 07/26/2021 1.7  1.6 - 2.4 mg/dL Final    SAMPLES BEING HELD 07/26/2021 1BLUE   Final    COMMENT 07/26/2021 Add-on orders for these samples will be processed based on acceptable specimen integrity and analyte stability, which may vary by analyte. Final       Allergies   Allergen Reactions    Peppermint Hives       Current Outpatient Medications   Medication Sig    tiZANidine (ZANAFLEX) 4 mg tablet     venlafaxine-SR (EFFEXOR-XR) 75 mg capsule Take 1 Capsule by mouth daily.  ALPRAZolam (XANAX) 0.5 mg tablet Take 1 Tablet by mouth three (3) times daily as needed for Anxiety. Max Daily Amount: 1.5 mg.    topiramate (TOPAMAX) 25 mg tablet Take 1 Tablet by mouth two (2) times a day.  metoprolol tartrate (LOPRESSOR) 25 mg tablet 25 mg. Indications: takes as needed for heart palpitations  Indications: takes as needed for heart palpitations     No current facility-administered medications for this visit.        Past medical history/surgical history, family history, and social history have been reviewed for today's visit      ROS    A ten system review of constitutional, cardiovascular, respiratory, musculoskeletal, endocrine, skin, SHEENT, genitourinary, psychiatric and neurologic systems was obtained and is unremarkable except as mentioned under HPI          EXAMINATION:     Visit Vitals  /64 (BP 1 Location: Right arm, BP Patient Position: At rest, BP Cuff Size: Large adult)   Pulse 84   Temp 98.6 °F (37 °C) (Skin)   Resp 16   Ht 5' 2\" (1.575 m)   Wt 136 lb (61.7 kg)   SpO2 98%   BMI 24.87 kg/m²         General appearance: Patient is well-developed and well-nourished in no apparent distress and well groomed. Psych/mental health:  Affect: Appropriate    PHQ  3 most recent PHQ Screens 7/12/2021   Little interest or pleasure in doing things Not at all   Feeling down, depressed, irritable, or hopeless Not at all   Total Score PHQ 2 0       HEENT:   Normocephalic  With evidence of trauma: No  Full range of motion head neck: Yes  Tenderness to palpation of the head neck region: No      Cardiovascular:     Extremities warm to touch: Yes  Extremity swelling: No  Discoloration: No  Evidence of PVD: No    Respiratory:   Dyspnea on exertion: No   Abnormal effort on casual observation: No   Use of portable oxygen: No   Evidence of cyanosis: No     Musculoskeletal:   Evidence of significant bone deformities: No   Spinal curvature: No     Integumentary:    Obvious bruising: No   Lacerations or discoloration on casual observation: No       Neurological Examination:   Mental Status:        MMSE  No flowsheet data found. Formal testing was not completed    there was nothing concerning on general observation and discussion.    Alert oriented and appropriate to general conversation  Normal processing on general observation  Followed conversation and responded seemingly appropriate throughout the office visit  No word finding difficulties noted on casual observation  Able to follow directions without difficulty     Cranial Nerves:    EOMs intact gaze is conjugate  No nystagmus is appreciated  Facial motor intact bilaterally  Hearing intact to conversation  Voice with normal projection, no evidence of secretion pooling  Shoulder shrug intact bilaterally  No tongue deviation appreciated     Motor:   Normal bulk  No tremor appreciated on today's exam  No abnormal movements appreciated on today's exam  Moves extremities spontaneously and with purpose  5/5 x 4      Sensation: Intact to light touch    Coordination/Cerebellar:   FTN intact bilaterally    Gait: Ambulates independently    Reflexes: Right upgoing toe left downgoing reflexes brisk but symmetrical    Fall risk assessment  Fall Risk Assessment, last 12 mths 3/8/2021   Able to walk? Yes   Fall in past 12 months? 0   Do you feel unsteady?  0   Are you worried about falling 0                 Sukhdev Flood MS, ANP-BC, [facility]

## 2021-08-24 NOTE — PROGRESS NOTES
Patient's vitamin B12 level is on the low end of normal.  I generally like to see levels greater than 400 hers is 262.   I would like her to take an over-the-counter vitamin B12 supplement I usually recommend the Gummies over the tablets as tablets can sometimes cause stomach upset and just take it once a week

## 2021-08-24 NOTE — PATIENT INSTRUCTIONS
As per discussion    I am going to order an MRI scan of your middle back to make sure there is no abnormalities that could have contributed to your symptoms  Central scheduling should be calling you to set up the test that have been ordered. If you do not hear from them you can reach out to them at [phone] to get that completed. Also can order some additional blood work to make sure there is no infectious or inflammatory process contributing to your symptoms    For now continue activity as tolerated without restrictions    I strongly encourage you to use my chart if you need to contact us. Presently with a high utilization of telehealth it is very difficult to get through on her phone lines. If you have not already activated my chart or you forget your password their instructions in this packet to get my chart activated. Office Policies    o Phone calls/patient messages:  Please allow up to 24 hours for someone in the office to contact you about your call or message. Be mindful your provider may be out of the office or your message may require further review. We encourage you to use [facility] for your messages as this is a faster, more efficient way to communicate with our office    o Medication Refills:  Prescription medications require up to 48 business hours to process. We encourage you to use [facility] for your refills. For controlled medications: Please allow up to 72 business hours to process. Certain medications may require you to  a written prescription at our office. NO narcotic/controlled medications will be prescribed after 4pm Monday through Friday or on weekends    o Form/Paperwork Completion:  We ask that you allow 7-14 business days. You may also download your forms to [facility] to have your doctor print off.

## 2021-08-24 NOTE — PROGRESS NOTES
Zhen Peoples is a 23 y.o. female  Chief Complaint   Patient presents with    Follow-up     1. Have you been to the ER, urgent care clinic since your last visit? ER numbness in leg. Hospitalized since your last visit?no    2. Have you seen or consulted any other health care providers outside of the [address] [place] [address] since your last visit? Include any pap smears or colon screening.  No  Visit Vitals  /64 (BP 1 Location: Right arm, BP Patient Position: At rest, BP Cuff Size: Large adult)   Pulse 84   Temp 98.6 °F (37 °C) (Skin)   Resp 16   Ht 5' 2\" (1.575 m)   Wt 136 lb (61.7 kg)   SpO2 98%   BMI 24.87 kg/m²     Health Maintenance   Topic Date Due    Hepatitis C Screening  Never done    HPV Age 9Y-34Y (1 - 2-dose series) Never done    COVID-19 Vaccine (1) Never done    PAP AKA CERVICAL CYTOLOGY  Never done    Flu Vaccine (1) 09/01/2021    DTaP/Tdap/Td series (2 - Td or Tdap) 04/18/2026    Pneumococcal 0-64 years  Aged [place]

## 2021-08-26 LAB
CENTROMERE B AB SER-ACNC: <0.2 AI (ref 0–0.9)
CHROMATIN AB SERPL-ACNC: <0.2 AI (ref 0–0.9)
DSDNA AB SER-ACNC: 1 IU/ML (ref 0–9)
ENA JO1 AB SER-ACNC: <0.2 AI (ref 0–0.9)
ENA RNP AB SER-ACNC: <0.2 AI (ref 0–0.9)
ENA SCL70 AB SER-ACNC: <0.2 AI (ref 0–0.9)
ENA SM AB SER-ACNC: <0.2 AI (ref 0–0.9)
ENA SM+RNP AB SER-ACNC: <0.2 AI (ref 0–0.9)
ENA SS-A AB SER-ACNC: <0.2 AI (ref 0–0.9)
ENA SS-B AB SER-ACNC: <0.2 AI (ref 0–0.9)
RIBOSOMAL P AB SER-ACNC: <0.2 AI (ref 0–0.9)
SEE BELOW:, 164879: NORMAL

## 2021-08-28 LAB

## 2021-11-01 RX ORDER — VENLAFAXINE HYDROCHLORIDE 75 MG/1
75 CAPSULE, EXTENDED RELEASE ORAL DAILY
Qty: 90 CAPSULE | Refills: 1 | Status: SHIPPED | OUTPATIENT
Start: 2021-11-01 | End: 2021-11-29

## 2021-11-01 NOTE — TELEPHONE ENCOUNTER
RX refill request from the patient/pharmacy. Patient last seen 07- with labs, and next appt. scheduled for 12-  Requested Prescriptions     Pending Prescriptions Disp Refills    venlafaxine-SR (EFFEXOR-XR) 75 mg capsule 90 Capsule 1     Sig: Take 1 Capsule by mouth daily. Hollie Agarwal

## 2021-11-29 RX ORDER — VENLAFAXINE HYDROCHLORIDE 75 MG/1
CAPSULE, EXTENDED RELEASE ORAL
Qty: 30 CAPSULE | Refills: 5 | Status: SHIPPED | OUTPATIENT
Start: 2021-11-29 | End: 2021-12-08 | Stop reason: SDUPTHER

## 2021-11-29 NOTE — TELEPHONE ENCOUNTER
RX refill request from the patient/pharmacy. Patient last seen 07- with labs, and does not have a f/up appointment.   Requested Prescriptions     Pending Prescriptions Disp Refills    venlafaxine-SR (EFFEXOR-XR) 75 mg capsule [Pharmacy Med Name: VENLAFAXINE HCL ER 75 MG CAP] 30 Capsule 5     Sig: TAKE 1 CAPSULE BY MOUTH EVERY DAY

## 2021-12-07 NOTE — PROGRESS NOTES
Chief Complaint   Patient presents with    Anxiety     3 month follow up    Migraine       SUBJECTIVE:    Tereso Erazo is a 24 y.o. female who returns in follow-up for medical problems include depression, anxiety, migraine headaches/tension vascular headaches, palpitations and other medical problems. She did have COVID-19 infection last month and has not received her Covid vaccine. She Fortune denies any severe complications from the disease. She denies any chest pain, shortness of breath, palpitations, PND, orthopnea or cardiac or respiratory complaints. She notes no GI or  complaints. She has no headaches, dizziness or neurologic complaints. She has no current active arthritic complaints and and no other complaints on complete view of systems. Current Outpatient Medications   Medication Sig Dispense Refill    venlafaxine-SR (EFFEXOR-XR) 75 mg capsule Take 1 Capsule by mouth daily. 30 Capsule 5    ALPRAZolam (XANAX) 0.5 mg tablet Take 1 Tablet by mouth three (3) times daily as needed for Anxiety. Max Daily Amount: 1.5 mg. 50 Tablet 0    tiZANidine (ZANAFLEX) 4 mg tablet Take 4 mg by mouth. Indications: as needed      topiramate (TOPAMAX) 25 mg tablet Take 1 Tablet by mouth two (2) times a day. 60 Tablet prn    metoprolol tartrate (LOPRESSOR) 25 mg tablet 25 mg. Indications: takes as needed for heart palpitations  Indications: takes as needed for heart palpitations 30 Tab prn     Past Medical History:   Diagnosis Date    Anxiety     Depression     mild/ Anxiety    Palpitations      History reviewed. No pertinent surgical history.   Allergies   Allergen Reactions    Peppermint Hives       REVIEW OF SYSTEMS:  General: negative for - chills or fever, or weight loss or gain  ENT: negative for - headaches, nasal congestion or tinnitus  Eyes: no blurred or visual changes  Neck: No stiffness or swollen nodes  Respiratory: negative for - cough, hemoptysis, shortness of breath or wheezing  Cardiovascular : negative for - chest pain, edema, palpitations or shortness of breath  Gastrointestinal: negative for - abdominal pain, blood in stools, heartburn or nausea/vomiting  Genito-Urinary: no dysuria, trouble voiding, or hematuria  Musculoskeletal: negative for - gait disturbance, joint pain, joint stiffness or joint swelling  Neurological: no TIA or stroke symptoms  Hematologic: no bruises, no bleeding  Lymphatic: no swollen glands  Integument: no lumps, mole changes, nail changes or rash  Endocrine:no malaise/lethargy poly uria or polydipsia or unexpected weight changes        Social History     Socioeconomic History    Marital status: SINGLE   Tobacco Use    Smoking status: Former Smoker     Packs/day: 0.25     Types: Cigarettes     Quit date: 2020     Years since quittin.1    Smokeless tobacco: Never Used    Tobacco comment: smokes 1 cigarette a day   Vaping Use    Vaping Use: Some days    Start date: 2019    Substances: Nicotine    Devices: Pre-filled pod   Substance and Sexual Activity    Alcohol use: No    Drug use: No    Sexual activity: Never     Birth control/protection: Injection     Family History   Problem Relation Age of Onset    No Known Problems Mother     No Known Problems Father     Asthma Sister     Diabetes Maternal Grandmother     Diabetes Maternal Grandfather     Diabetes Paternal Grandmother     Diabetes Paternal Grandfather        OBJECTIVE:     Visit Vitals  /76 (BP 1 Location: Left upper arm, BP Patient Position: Sitting, BP Cuff Size: Adult)   Pulse 73   Temp 98.1 °F (36.7 °C) (Oral)   Resp 16   Ht 5' 2\" (1.575 m)   Wt 137 lb 12.8 oz (62.5 kg)   LMP 2021 (Approximate)   SpO2 98%   BMI 25.20 kg/m²     CONSTITUTIONAL:   well nourished, appears age appropriate  EYES: sclera anicteric, PERRL, EOMI  ENMT:nares clear, moist mucous membranes, pharynx clear  NECK: supple.  Thyroid normal, No JVD or bruits  RESPIRATORY: Chest: clear to ascultation and percussion, normal inspiratory effort  CARDIOVASCULAR: Heart: regular rate and rhythm no murmurs, rubs or gallops, PMI not displaced, No thrills, no peripheral edema  GASTROINTESTINAL: Abdomen: non distended, soft, non tender, bowel sounds normal  HEMATOLOGIC: no purpura, petechiae or bruising  LYMPHATIC: No lymph node enlargemant  MUSCULOSKELETAL: Extremities: no active synovitis, pulse 1+   INTEGUMENT: No unusual rashes or suspicious skin lesions noted. Nails appear normal.  PERIPHERAL VASCULAR: normal pulses femoral, PT and DP  NEUROLOGIC: non-focal exam, A & O X 3  PSYCHIATRIC:, appropriate affect     ASSESSMENT:   1. Anxiety    2. Chronic paroxysmal hemicrania, not intractable    3. History of hyperthyroidism    4. Palpitations    5. COVID-19      Impression  1. Anxiety that is stable  2. Headaches seem to be stable  3. History of hypothyroidism stable  4. Palpitations controlled with metoprolol  5. Recent COVID-19 infection I will check a CMP to make sure kidneys liver and blood sugar all look okay  I renewed her Xanax  I will recheck her myself again in 3 months or sooner should the be a problem. I will call her lab results in interim. PLAN:  .  Orders Placed This Encounter    METABOLIC PANEL, COMPREHENSIVE    venlafaxine-SR (EFFEXOR-XR) 75 mg capsule    ALPRAZolam (XANAX) 0.5 mg tablet         ATTENTION:   This medical record was transcribed using an electronic medical records system. Although proofread, it may and can contain electronic and spelling errors. Other human spelling and other errors may be present. Corrections may be executed at a later time. Please feel free to contact us for any clarifications as needed. Follow-up and Dispositions    · Return in about 3 months (around 3/8/2022). No results found for any visits on 12/08/21. Jewel Lefort, MD    The patient verbalized understanding of the problems and plans as explained.

## 2021-12-08 ENCOUNTER — OFFICE VISIT (OUTPATIENT)
Dept: INTERNAL MEDICINE CLINIC | Age: 21
End: 2021-12-08
Payer: MEDICAID

## 2021-12-08 VITALS
DIASTOLIC BLOOD PRESSURE: 76 MMHG | RESPIRATION RATE: 16 BRPM | HEART RATE: 73 BPM | OXYGEN SATURATION: 98 % | SYSTOLIC BLOOD PRESSURE: 110 MMHG | BODY MASS INDEX: 25.36 KG/M2 | TEMPERATURE: 98.1 F | HEIGHT: 62 IN | WEIGHT: 137.8 LBS

## 2021-12-08 DIAGNOSIS — Z86.39 HISTORY OF HYPERTHYROIDISM: ICD-10-CM

## 2021-12-08 DIAGNOSIS — F41.9 ANXIETY: Primary | ICD-10-CM

## 2021-12-08 DIAGNOSIS — G44.049 CHRONIC PAROXYSMAL HEMICRANIA, NOT INTRACTABLE: ICD-10-CM

## 2021-12-08 DIAGNOSIS — U07.1 COVID-19: ICD-10-CM

## 2021-12-08 DIAGNOSIS — R00.2 PALPITATIONS: ICD-10-CM

## 2021-12-08 PROCEDURE — 99214 OFFICE O/P EST MOD 30 MIN: CPT | Performed by: INTERNAL MEDICINE

## 2021-12-08 RX ORDER — ALPRAZOLAM 0.5 MG/1
0.5 TABLET ORAL
Qty: 50 TABLET | Refills: 0 | Status: SHIPPED | OUTPATIENT
Start: 2021-12-08 | End: 2022-03-15 | Stop reason: SDUPTHER

## 2021-12-08 RX ORDER — VENLAFAXINE HYDROCHLORIDE 75 MG/1
75 CAPSULE, EXTENDED RELEASE ORAL DAILY
Qty: 30 CAPSULE | Refills: 5 | Status: SHIPPED | OUTPATIENT
Start: 2021-12-08 | End: 2021-12-27 | Stop reason: SDUPTHER

## 2021-12-08 NOTE — PATIENT INSTRUCTIONS
Learning About Coronavirus [phone])  What is coronavirus (COVID-19)? COVID-19 is a disease caused by a type of coronavirus. This illness was first found in December 2019. It has since spread worldwide. Coronaviruses are a large group of viruses. They cause the common cold. They also cause more serious illnesses like [place] respiratory syndrome (MERS) and severe acute respiratory syndrome (SARS). COVID-19 is caused by a novel coronavirus. That means it's a new type that has not been seen in people before. What are the symptoms? COVID-19 symptoms may include:  · Fever. · Cough. · Trouble breathing. · Chills or repeated shaking with chills. · Muscle and body aches. · Headache. · Sore throat. · New loss of taste or smell. · Vomiting. · Diarrhea. In severe cases, COVID-19 can cause pneumonia and make it hard to breathe without help from a machine. It can cause death. How is it diagnosed? COVID-19 is diagnosed with a viral test. This may also be called a PCR test or antigen test. It looks for evidence of the virus in your breathing passages or lungs (respiratory system). The test is most often done on a sample from the nose, throat, or lungs. It's sometimes done on a sample of saliva. One way a sample is collected is by putting a long swab into the back of your nose. How is it treated? Mild cases of COVID-19 can be treated at home. Serious cases need treatment in the hospital. Treatment may include medicines to reduce symptoms, plus breathing support such as oxygen therapy or a ventilator. Some people may be placed on their belly to help their oxygen levels. Treatments that may help people who have COVID-19 include:  Antiviral medicines. These medicines treat viral infections. Remdesivir is an example. Immune-based therapy. These medicines help the immune system fight COVID-19. Examples include monoclonal antibodies. Blood thinners. These medicines help prevent blood clots. People with severe illness are at risk for blood clots. How can you protect yourself and others? The best way to protect yourself from getting sick is to:  · Get vaccinated. · Avoid sick people. · If you are not fully vaccinated:  ? Wear a mask if you have to go to public areas. ? Avoid crowds and try to stay at least 6 feet away from other people. · Cover your mouth with a tissue when you cough or sneeze. · Wash your hands often, especially after you cough or sneeze. Use soap and water, and scrub for at least 20 seconds. If soap and water aren't available, use an alcohol-based hand . · Avoid touching your mouth, nose, and eyes. To help avoid spreading the virus to others:  · Get vaccinated. · Cover your mouth with a tissue when you cough or sneeze. · Wash your hands often, especially after you cough or sneeze. Use soap and water, and scrub for at least 20 seconds. If soap and water aren't available, use an alcohol-based hand . · If you have been exposed to the virus and are not fully vaccinated:  ? Stay home. Don't go to school, work, or public areas. And don't use public transportation, ride-shares, or taxis unless you have no choice. ? Wear a mask if you have to go to public areas, like the pharmacy. · If you're sick:  ? Leave your home only if you need to get medical care. But call the doctor's office first so they know you're coming. And wear a mask. ? Wear a mask whenever you're around other people. ? Limit contact with pets and people in your home. If possible, stay in a separate bedroom and use a separate bathroom. ? Clean and disinfect your home every day. Use household  and disinfectant wipes or sprays. Take special care to clean things that you touch with your hands. How can you self-isolate when you have COVID-19? If you have COVID-19, there are things you can do to help avoid spreading the virus to others. · Limit contact with people in your home.  If possible, stay in a separate bedroom and use a separate bathroom. · Wear a mask when you are around other people. · If you have to leave home, avoid crowds and try to stay at least 6 feet away from other people. · Avoid contact with pets and other animals. · Cover your mouth and nose with a tissue when you cough or sneeze. Then throw it in the trash right away. · Wash your hands often, especially after you cough or sneeze. Use soap and water, and scrub for at least 20 seconds. If soap and water aren't available, use an alcohol-based hand . · Don't share personal household items. These include bedding, towels, cups and glasses, and eating utensils. · [address] in the warmest water allowed for the fabric type, and dry it completely. It's okay to wash other people's laundry with yours. · Clean and disinfect your home. Use household  and disinfectant wipes or sprays. When should you call for help? Call 911 anytime you think you may need emergency care. For example, call if you have life-threatening symptoms, such as:    · You have severe trouble breathing. (You can't talk at all.)     · You have constant chest pain or pressure.     · You are severely dizzy or lightheaded.     · You are confused or can't think clearly.     · You have pale, gray, or blue-colored skin or lips.     · You pass out (lose consciousness) or are very hard to wake up. Call your doctor now or seek immediate medical care if:    · You have moderate trouble breathing. (You can't speak a full sentence.)     · You are coughing up blood (more than about 1 teaspoon).     · You have signs of low blood pressure. These include feeling lightheaded; being too weak to stand; and having cold, pale, clammy skin.    Watch closely for changes in your health, and be sure to contact your doctor if:    · Your symptoms get worse.     · You are not getting better as expected.     · You have new or worse symptoms of anxiety, depression, nightmares, or flashbacks. Call before you go to the doctor's office. Follow their instructions. And wear a mask. Current as of: July 1, 2021               Content Version: 13.0  © 2006-2021 [employer]. Care instructions adapted under license by [employer] (which disclaims liability or warranty for this information). If you have questions about a medical condition or this instruction, always ask your healthcare professional. [place] [address] any warranty or liability for your use of this information.

## 2021-12-08 NOTE — PROGRESS NOTES
Chief Complaint   Patient presents with    Anxiety     3 month follow up    Migraine     Visit Vitals  /76 (BP 1 Location: Left upper arm, BP Patient Position: Sitting, BP Cuff Size: Adult)   Pulse 73   Temp 98.1 °F (36.7 °C) (Oral)   Resp 16   Ht 5' 2\" (1.575 m)   Wt 137 lb 12.8 oz (62.5 kg)   LMP 11/17/2021 (Approximate)   SpO2 98%   BMI 25.20 kg/m²     1. Have you been to the ER, urgent care clinic since your last visit? Hospitalized since your last visit? No    2. Have you seen or consulted any other health care providers outside of the [address] [place] [address] since your last visit? Include any pap smears or colon screening.  No

## 2021-12-09 LAB
ALBUMIN SERPL-MCNC: 5 G/DL (ref 3.9–5)
ALBUMIN/GLOB SERPL: 1.9 {RATIO} (ref 1.2–2.2)
ALP SERPL-CCNC: 52 IU/L (ref 44–121)
ALT SERPL-CCNC: 13 IU/L (ref 0–32)
AST SERPL-CCNC: 15 IU/L (ref 0–40)
BILIRUB SERPL-MCNC: 0.3 MG/DL (ref 0–1.2)
BUN SERPL-MCNC: 15 MG/DL (ref 6–20)
BUN/CREAT SERPL: 20 (ref 9–23)
CALCIUM SERPL-MCNC: 9.7 MG/DL (ref 8.7–10.2)
CHLORIDE SERPL-SCNC: 104 MMOL/L (ref 96–106)
CO2 SERPL-SCNC: 23 MMOL/L (ref 20–29)
CREAT SERPL-MCNC: 0.76 MG/DL (ref 0.57–1)
GLOBULIN SER CALC-MCNC: 2.7 G/DL (ref 1.5–4.5)
GLUCOSE SERPL-MCNC: 102 MG/DL (ref 65–99)
POTASSIUM SERPL-SCNC: 4.9 MMOL/L (ref 3.5–5.2)
PROT SERPL-MCNC: 7.7 G/DL (ref 6–8.5)
SODIUM SERPL-SCNC: 138 MMOL/L (ref 134–144)

## 2021-12-27 RX ORDER — VENLAFAXINE HYDROCHLORIDE 75 MG/1
75 CAPSULE, EXTENDED RELEASE ORAL DAILY
Qty: 30 CAPSULE | Refills: 5 | Status: SHIPPED | OUTPATIENT
Start: 2021-12-27 | End: 2022-04-27 | Stop reason: SDUPTHER

## 2021-12-27 NOTE — TELEPHONE ENCOUNTER
PCP: Te Berkowitz MD    Last appt: 12/8/2021  Future Appointments   Date Time Provider Zuleyka Rodarte   3/14/2022  9:20 AM Te Berkowitz MD [facility]       Requested Prescriptions     Pending Prescriptions Disp Refills    venlafaxine-SR [facility] 75 mg capsule 30 Capsule 5     Sig: Take 1 Capsule by mouth daily.

## 2022-03-11 NOTE — TELEPHONE ENCOUNTER
RX refill request from the patient/pharmacy. Patient last seen 12- with labs, and next appt. scheduled for 03-  Requested Prescriptions     Pending Prescriptions Disp Refills    metoprolol tartrate (LOPRESSOR) 25 mg tablet [Pharmacy Med Name: Metoprolol Tartrate 25 MG Oral Tablet] 30 Tablet 5     Sig: TAKE 1 TABLET BY MOUTH ONCE DAILY AS NEEDED FOR  HEART  PALPITATIONS   .

## 2022-03-12 RX ORDER — METOPROLOL TARTRATE 25 MG/1
TABLET, FILM COATED ORAL
Qty: 30 TABLET | Refills: 5 | Status: SHIPPED | OUTPATIENT
Start: 2022-03-12 | End: 2022-03-15 | Stop reason: SDUPTHER

## 2022-03-14 NOTE — PROGRESS NOTES
Complete Physical       HPI:     Vikas Saravia is a 24y.o. year old female who is here for her annual comprehensive healthcare exam her prior medical problems include anxiety, chronic migraine headaches, history of tension vascular headaches, palpitations, history of hyperthyroidism, depression and other multiple medical problems. She is taking her medications and trying to follow her diet and try to remain physically active. She does work at [employer] or gets lots of walking on a daily basis. She denies any chest pain, shortness of breath, palpitations, PND, orthopnea or other cardiac or respiratory complaints. She notes no current GI or  complaints. She notes no headaches, dizziness or neurologic complaints. There are no current active arthritic complaints and and no other complaints on complete review of systems. Visit Vitals  /80 (BP 1 Location: Left upper arm, BP Patient Position: Sitting, BP Cuff Size: Adult)   Pulse 69   Temp 98.5 °F (36.9 °C) (Oral)   Resp 16   Ht 5' 2\" (1.575 m)   Wt 146 lb 3.2 oz (66.3 kg)   SpO2 98%   BMI 26.74 kg/m²       Past Medical History:   Diagnosis Date    Anxiety     Depression     mild/ Anxiety    Palpitations        History reviewed. No pertinent surgical history. Prior to Admission medications    Medication Sig Start Date End Date Taking? Authorizing Provider   metoprolol tartrate (LOPRESSOR) 25 mg tablet TAKE 1 TABLET BY MOUTH ONCE DAILY AS NEEDED FOR  HEART  PALPITATIONS 3/15/22  Yes Natasha Boateng MD   ALPRAZolam Nathan Gauthier) 0.5 mg tablet Take 1 Tablet by mouth three (3) times daily as needed for Anxiety. Max Daily Amount: 1.5 mg. 3/15/22  Yes Natasha Boateng MD   venlafaxine-SR [facility] 75 mg capsule Take 1 Capsule by mouth daily. 12/27/21  Yes Natasha Boateng MD   tiZANidine (ZANAFLEX) 4 mg tablet Take 4 mg by mouth.  Indications: as needed 8/17/21  Yes Provider, Historical   topiramate (TOPAMAX) 25 mg tablet Take 1 Tablet by mouth two (2) times a day. 21  Yes Elly Barry MD        Allergies   Allergen Reactions    Peppermint Hives        Family History   Problem Relation Age of Onset    No Known Problems Mother     No Known Problems Father     Asthma Sister     Diabetes Maternal Grandmother     Diabetes Maternal Grandfather     Diabetes Paternal Grandmother     Diabetes Paternal Grandfather        Social History     Socioeconomic History    Marital status: SINGLE     Spouse name: Not on file    Number of children: Not on file    Years of education: Not on file    Highest education level: Not on file   Occupational History    Not on file   Tobacco Use    Smoking status: Former Smoker     Packs/day: 0.25     Types: Cigarettes     Quit date: 2020     Years since quittin.3    Smokeless tobacco: Never Used    Tobacco comment: smokes 1 cigarette a day   Vaping Use    Vaping Use: Some days    Start date: 2019    Substances: Nicotine    Devices: Pre-filled pod   Substance and Sexual Activity    Alcohol use: No    Drug use: No    Sexual activity: Never     Birth control/protection: Injection   Other Topics Concern    Not on file   Social History Narrative    Not on file     Social Determinants of Health     Financial Resource Strain:     Difficulty of Paying Living Expenses: Not on file   Food Insecurity:     Worried About Running Out of Food in the Last Year: Not on file    Nohemi of Food in the Last Year: Not on file   Transportation Needs:     Lack of Transportation (Medical): Not on file    Lack of Transportation (Non-Medical):  Not on file   Physical Activity:     Days of Exercise per Week: Not on file    Minutes of Exercise per Session: Not on file   Stress:     Feeling of Stress : Not on file   Social Connections:     Frequency of Communication with Friends and Family: Not on file    Frequency of Social Gatherings with Friends and Family: Not on file    Attends Taoism Services: Not on file    Active Member of Clubs or Organizations: Not on file    Attends Club or Organization Meetings: Not on file    Marital Status: Not on file   Intimate Partner Violence:     Fear of Current or Ex-Partner: Not on file    Emotionally Abused: Not on file    Physically Abused: Not on file    Sexually Abused: Not on file   Housing Stability:     Unable to Pay for Housing in the Last Year: Not on file    Number of [place] in the Last Year: Not on file    Unstable Housing in the Last Year: Not on file        ROS:     Constitutional: She denies fevers, weight loss, sweats. Eyes: No blurred or double vision. ENT: No difficulty with swallowing, taste, speech or smell. NECK: no stiffness swelling or lymph node enlargement  Respiratory: No cough wheezing or shortness of breath. Cardiovascular: Denies chest pain, palpitations, unexplained indigestion or syncope. Breast: She has noted no masses or lumps and no discharge or axillary swelling  Gastrointestinal:  No changes in bowel movements, no abdominal pain, no bloating. Genitourinary: No discharge or abnormal bleeding or pain  Extremities: No joint pain, stiffness or swelling. Neurological:  No numbness, tingling, burring paresthesias or loss of motor strength. No syncope, dizziness or frequent headache  Skin:  No recent rashes or mole changes. Psychiatric/Behavioral:  Negative for depression. The patient is not nervous/anxious.    HEMATOLOGIC: no easy bruising or bleeding gums  Endocrine: no sweats of urinary frequency or excessive thirst      Physical Examination:     Vitals:    03/15/22 1129   BP: 122/80   Pulse: 69   Resp: 16   Temp: 98.5 °F (36.9 °C)   TempSrc: Oral   SpO2: 98%   Weight: 146 lb 3.2 oz (66.3 kg)   Height: 5' 2\" (1.575 m)   PainSc:   0 - No pain        General appearance - alert, well appearing, and in no distress  Mental status - alert, oriented to person, place, and time  HEENT:  Ears - bilateral TM's and external ear canals clear  Eyes - pupillary responses were normal.  Extraocular muscle function intact. Lids and conjunctiva not injected. Fundoscopic exam revealed sharp disc margins. eye movements intact  Pharynx- clear with teeth in good repair. No masses were noted  Neck - supple without thyromegaly or burit. No JVD noted  Lungs - clear to auscultation and percussion  Cardiac- normal rate, regular rhythm without murmurs. PMI not displaced. No gallop, rub or click  Breast: deferred to GYN  Abdomen - flat, soft, non-tender without palpable organomegaly or mass. No pulsatile mass was felt, and not bruit was heard. Bowel sounds were active   Female - deferred to GYN  Rectal - deferred to GYN  Extremities -  no clubbing cyanosis or edema  Lymphatics - no palpable lymphadenopathy, no hepatosplenomegaly  Peripheral vascular - Dorsalis pedis and posterior tibial pulses felt without difficulty  Skin - no rash or unusual mole change noted  Neurological - Cranial nerves II-XII grossly intact. Motor strength 5/5. DTR's 2+ and symmetric. Station and gait normal  Back exam - full range of motion, no tenderness, palpable spasm or pain on motion  Musculoskeletal - no joint tenderness, deformity or swelling  Hematologic: no purpura, petechiae or bruising    Assessment/Plan:     1. Annual physical exam    2. Palpitations    3. History of hyperthyroidism    4. Anxiety    5. Depression, unspecified depression type        Impression  1. Annual physical examination is normal  2. Palpitations controlled with metoprolol renewed  3. History of hyperthyroidism no recent problems  4. Anxiety renewed her Xanax  5. Depression that is stable  Follow-up in 3 months or sooner should the be a problem. I will call with lab results in interim.     Orders Placed This Encounter    CBC WITH AUTOMATED DIFF     Standing Status:   Future     Standing Expiration Date:   0/96/4650    METABOLIC PANEL, COMPREHENSIVE     Standing Status:   Future     Standing Expiration Date:   3/14/2023    LIPID PANEL     Standing Status:   Future     Standing Expiration Date:   3/14/2023    T4 (THYROXINE)     Standing Status:   Future     Standing Expiration Date:   3/14/2023    TSH 3RD GENERATION     Standing Status:   Future     Standing Expiration Date:   3/14/2023    URINALYSIS W/ REFLEX CULTURE     Standing Status:   Future     Standing Expiration Date:   3/14/2023    metoprolol tartrate (LOPRESSOR) 25 mg tablet     Sig: TAKE 1 TABLET BY MOUTH ONCE DAILY AS NEEDED FOR  HEART  PALPITATIONS     Dispense:  30 Tablet     Refill:  5    ALPRAZolam (XANAX) 0.5 mg tablet     Sig: Take 1 Tablet by mouth three (3) times daily as needed for Anxiety. Max Daily Amount: 1.5 mg.     Dispense:  50 Tablet     Refill:  0        No results found for any visits on 03/15/22. I have reviewed the patient's medical history in detail and updated the computerized patient record. We had a prolonged discussion about these complex clinical issues and went over the various important aspects to consider. All questions were answered. Advised her to call back or return to office if symptoms do not improve, change in nature, or persist.    She was given an after visit summary or informed of [employer] Access which includes patient instructions, diagnoses, current medications, & vitals. She expressed understanding with the diagnosis and plan.       Rachana Orellana MD

## 2022-03-15 ENCOUNTER — OFFICE VISIT (OUTPATIENT)
Dept: INTERNAL MEDICINE CLINIC | Age: 22
End: 2022-03-15
Payer: MEDICAID

## 2022-03-15 VITALS
DIASTOLIC BLOOD PRESSURE: 80 MMHG | BODY MASS INDEX: 26.91 KG/M2 | RESPIRATION RATE: 16 BRPM | WEIGHT: 146.2 LBS | HEART RATE: 69 BPM | OXYGEN SATURATION: 98 % | TEMPERATURE: 98.5 F | HEIGHT: 62 IN | SYSTOLIC BLOOD PRESSURE: 122 MMHG

## 2022-03-15 DIAGNOSIS — F41.9 ANXIETY: ICD-10-CM

## 2022-03-15 DIAGNOSIS — F32.A DEPRESSION, UNSPECIFIED DEPRESSION TYPE: ICD-10-CM

## 2022-03-15 DIAGNOSIS — R00.2 PALPITATIONS: ICD-10-CM

## 2022-03-15 DIAGNOSIS — Z00.00 ANNUAL PHYSICAL EXAM: Primary | ICD-10-CM

## 2022-03-15 DIAGNOSIS — Z86.39 HISTORY OF HYPERTHYROIDISM: ICD-10-CM

## 2022-03-15 PROCEDURE — 99395 PREV VISIT EST AGE 18-39: CPT | Performed by: INTERNAL MEDICINE

## 2022-03-15 RX ORDER — METOPROLOL TARTRATE 25 MG/1
TABLET, FILM COATED ORAL
Qty: 30 TABLET | Refills: 5 | Status: SHIPPED | OUTPATIENT
Start: 2022-03-15 | End: 2022-09-26

## 2022-03-15 RX ORDER — ALPRAZOLAM 0.5 MG/1
0.5 TABLET ORAL
Qty: 50 TABLET | Refills: 0 | Status: SHIPPED | OUTPATIENT
Start: 2022-03-15 | End: 2022-07-11

## 2022-03-15 NOTE — PATIENT INSTRUCTIONS

## 2022-03-15 NOTE — PROGRESS NOTES
Chief Complaint   Patient presents with    Complete Physical     Visit Vitals  /80 (BP 1 Location: Left upper arm, BP Patient Position: Sitting, BP Cuff Size: Adult)   Pulse 69   Temp 98.5 °F (36.9 °C) (Oral)   Resp 16   Ht 5' 2\" (1.575 m)   Wt 146 lb 3.2 oz (66.3 kg)   SpO2 98%   BMI 26.74 kg/m²     1. Have you been to the ER, urgent care clinic since your last visit? Hospitalized since your last visit? No    2. Have you seen or consulted any other health care providers outside of the [address] [place] [address] since your last visit? Include any pap smears or colon screening.  No

## 2022-03-16 LAB
ALBUMIN SERPL-MCNC: 4.7 G/DL (ref 3.9–5)
ALBUMIN/GLOB SERPL: 2 {RATIO} (ref 1.2–2.2)
ALP SERPL-CCNC: 47 IU/L (ref 44–121)
ALT SERPL-CCNC: 12 IU/L (ref 0–32)
APPEARANCE UR: CLEAR
AST SERPL-CCNC: 16 IU/L (ref 0–40)
BACTERIA #/AREA URNS HPF: NORMAL /[HPF]
BASOPHILS # BLD AUTO: 0.1 X10E3/UL (ref 0–0.2)
BASOPHILS NFR BLD AUTO: 1 %
BILIRUB SERPL-MCNC: 0.2 MG/DL (ref 0–1.2)
BILIRUB UR QL STRIP: NEGATIVE
BUN SERPL-MCNC: 13 MG/DL (ref 6–20)
BUN/CREAT SERPL: 20 (ref 9–23)
CALCIUM SERPL-MCNC: 9.3 MG/DL (ref 8.7–10.2)
CASTS URNS QL MICRO: NORMAL /LPF
CHLORIDE SERPL-SCNC: 102 MMOL/L (ref 96–106)
CHOLEST SERPL-MCNC: 136 MG/DL (ref 100–199)
CO2 SERPL-SCNC: 23 MMOL/L (ref 20–29)
COLOR UR: YELLOW
CREAT SERPL-MCNC: 0.64 MG/DL (ref 0.57–1)
EGFR: 128 ML/MIN/1.73
EOSINOPHIL # BLD AUTO: 0.2 X10E3/UL (ref 0–0.4)
EOSINOPHIL NFR BLD AUTO: 4 %
EPI CELLS #/AREA URNS HPF: NORMAL /HPF (ref 0–10)
ERYTHROCYTE [DISTWIDTH] IN BLOOD BY AUTOMATED COUNT: 12 % (ref 11.7–15.4)
GLOBULIN SER CALC-MCNC: 2.4 G/DL (ref 1.5–4.5)
GLUCOSE SERPL-MCNC: 93 MG/DL (ref 65–99)
GLUCOSE UR QL STRIP: NEGATIVE
HCT VFR BLD AUTO: 41.8 % (ref 34–46.6)
HDLC SERPL-MCNC: 71 MG/DL
HGB BLD-MCNC: 13.5 G/DL (ref 11.1–15.9)
HGB UR QL STRIP: NEGATIVE
IMM GRANULOCYTES # BLD AUTO: 0 X10E3/UL (ref 0–0.1)
IMM GRANULOCYTES NFR BLD AUTO: 0 %
KETONES UR QL STRIP: NEGATIVE
LDLC SERPL CALC-MCNC: 54 MG/DL (ref 0–99)
LEUKOCYTE ESTERASE UR QL STRIP: NEGATIVE
LYMPHOCYTES # BLD AUTO: 1.8 X10E3/UL (ref 0.7–3.1)
LYMPHOCYTES NFR BLD AUTO: 32 %
MCH RBC QN AUTO: 30.2 PG (ref 26.6–33)
MCHC RBC AUTO-ENTMCNC: 32.3 G/DL (ref 31.5–35.7)
MCV RBC AUTO: 94 FL (ref 79–97)
MICRO URNS: ABNORMAL
MICRO URNS: ABNORMAL
MONOCYTES # BLD AUTO: 0.8 X10E3/UL (ref 0.1–0.9)
MONOCYTES NFR BLD AUTO: 14 %
NEUTROPHILS # BLD AUTO: 2.8 X10E3/UL (ref 1.4–7)
NEUTROPHILS NFR BLD AUTO: 49 %
NITRITE UR QL STRIP: NEGATIVE
PH UR STRIP: 8 [PH] (ref 5–7.5)
PLATELET # BLD AUTO: 268 X10E3/UL (ref 150–450)
POTASSIUM SERPL-SCNC: 4.4 MMOL/L (ref 3.5–5.2)
PROT SERPL-MCNC: 7.1 G/DL (ref 6–8.5)
PROT UR QL STRIP: NEGATIVE
RBC # BLD AUTO: 4.47 X10E6/UL (ref 3.77–5.28)
RBC #/AREA URNS HPF: NORMAL /HPF (ref 0–2)
SODIUM SERPL-SCNC: 138 MMOL/L (ref 134–144)
SP GR UR STRIP: 1.01 (ref 1–1.03)
T4 SERPL-MCNC: 5.5 UG/DL (ref 4.5–12)
TRIGL SERPL-MCNC: 49 MG/DL (ref 0–149)
TSH SERPL DL<=0.005 MIU/L-ACNC: 0.68 UIU/ML (ref 0.45–4.5)
URINALYSIS REFLEX, 377202: ABNORMAL
UROBILINOGEN UR STRIP-MCNC: 0.2 MG/DL (ref 0.2–1)
VLDLC SERPL CALC-MCNC: 11 MG/DL (ref 5–40)
WBC # BLD AUTO: 5.7 X10E3/UL (ref 3.4–10.8)
WBC #/AREA URNS HPF: NORMAL /HPF (ref 0–5)

## 2022-03-18 PROBLEM — R00.2 PALPITATIONS: Status: ACTIVE | Noted: 2020-01-28

## 2022-03-18 PROBLEM — R20.2 PARESTHESIA OF RIGHT LEG: Status: ACTIVE | Noted: 2021-08-24

## 2022-03-18 PROBLEM — G43.709 CHRONIC MIGRAINE WITHOUT AURA, NOT INTRACTABLE, WITHOUT STATUS MIGRAINOSUS: Status: ACTIVE | Noted: 2021-05-18

## 2022-03-19 PROBLEM — R51.9 HEADACHE: Status: ACTIVE | Noted: 2021-03-08

## 2022-03-19 PROBLEM — U07.1 COVID-19: Status: ACTIVE | Noted: 2021-12-08

## 2022-03-19 PROBLEM — F41.9 ANXIETY: Status: ACTIVE | Noted: 2021-03-07

## 2022-03-19 PROBLEM — F32.A DEPRESSION: Status: ACTIVE | Noted: 2021-07-12

## 2022-03-19 PROBLEM — Z00.00 ANNUAL PHYSICAL EXAM: Status: ACTIVE | Noted: 2020-01-28

## 2022-03-20 PROBLEM — Z86.39 HISTORY OF HYPERTHYROIDISM: Status: ACTIVE | Noted: 2020-01-28

## 2022-03-20 PROBLEM — G44.209 TENSION VASCULAR HEADACHE: Status: ACTIVE | Noted: 2021-05-18

## 2022-03-20 PROBLEM — G56.22 NEUROPATHY OF LEFT ULNAR NERVE AT WRIST: Status: ACTIVE | Noted: 2021-05-18

## 2022-04-11 PROBLEM — Z00.00 ANNUAL PHYSICAL EXAM: Status: RESOLVED | Noted: 2020-01-28 | Resolved: 2022-04-11

## 2022-04-27 RX ORDER — VENLAFAXINE HYDROCHLORIDE 75 MG/1
75 CAPSULE, EXTENDED RELEASE ORAL DAILY
Qty: 90 CAPSULE | Refills: 1 | Status: SHIPPED | OUTPATIENT
Start: 2022-04-27

## 2022-04-27 NOTE — TELEPHONE ENCOUNTER
RX refill request from the patient/pharmacy. Patient last seen 03- with labs, and next appt. scheduled for 06-  Requested Prescriptions     Pending Prescriptions Disp Refills    venlafaxine-SR (EFFEXOR-XR) 75 mg capsule 90 Capsule 1     Sig: Take 1 Capsule by mouth daily. Mar Bhagat

## 2022-06-26 NOTE — PROGRESS NOTES
Chief Complaint   Patient presents with    Anxiety     3 month f/up    Depression       SUBJECTIVE:    Shey Sy is a 24 y.o. female who returns in follow-up regarding her anxiety, depression, palpitations and other medical problems. She is taking her medications and as long she takes her medication she seems to do quite well. If she forgets to take her Effexor or the anxiety increases and then she has to take a Xanax. She denies any chest pain, shortness of breath, palpitations, PND, orthopnea or other cardiac or respiratory complaints. She notes no current GI/ complaints and there are no other complaints noted. Current Outpatient Medications   Medication Sig Dispense Refill    tiZANidine (ZANAFLEX) 4 mg tablet TAKE 1 TABLET BY MOUTH EVERY DAY AT NIGHT 30 Tablet 11    venlafaxine-SR (EFFEXOR-XR) 75 mg capsule Take 1 Capsule by mouth daily. 90 Capsule 1    metoprolol tartrate (LOPRESSOR) 25 mg tablet TAKE 1 TABLET BY MOUTH ONCE DAILY AS NEEDED FOR  HEART  PALPITATIONS 30 Tablet 5    ALPRAZolam (XANAX) 0.5 mg tablet Take 1 Tablet by mouth three (3) times daily as needed for Anxiety. Max Daily Amount: 1.5 mg. 50 Tablet 0    topiramate (TOPAMAX) 25 mg tablet Take 1 Tablet by mouth two (2) times a day. 60 Tablet prn     Past Medical History:   Diagnosis Date    Anxiety     Depression     mild/ Anxiety    Palpitations      History reviewed. No pertinent surgical history.   Allergies   Allergen Reactions    Peppermint Hives       REVIEW OF SYSTEMS:  General: negative for - chills or fever, or weight loss or gain  ENT: negative for - headaches, nasal congestion or tinnitus  Eyes: no blurred or visual changes  Neck: No stiffness or swollen nodes  Respiratory: negative for - cough, hemoptysis, shortness of breath or wheezing  Cardiovascular : negative for - chest pain, edema, palpitations or shortness of breath  Gastrointestinal: negative for - abdominal pain, blood in stools, heartburn or nausea/vomiting  Genito-Urinary: no dysuria, trouble voiding, or hematuria  Musculoskeletal: negative for - gait disturbance, joint pain, joint stiffness or joint swelling  Neurological: no TIA or stroke symptoms  Hematologic: no bruises, no bleeding  Lymphatic: no swollen glands  Integument: no lumps, mole changes, nail changes or rash  Endocrine:no malaise/lethargy poly uria or polydipsia or unexpected weight changes        Social History     Socioeconomic History    Marital status: SINGLE   Tobacco Use    Smoking status: Former Smoker     Packs/day: 0.25     Types: Cigarettes     Quit date: 2020     Years since quittin.6    Smokeless tobacco: Never Used    Tobacco comment: smokes 1 cigarette a day   Vaping Use    Vaping Use: Some days    Start date: 2019    Substances: Nicotine    Devices: Pre-filled pod   Substance and Sexual Activity    Alcohol use: No    Drug use: No    Sexual activity: Never     Birth control/protection: Injection     Family History   Problem Relation Age of Onset    No Known Problems Mother     No Known Problems Father     Asthma Sister     Diabetes Maternal Grandmother     Diabetes Maternal Grandfather     Diabetes Paternal Grandmother     Diabetes Paternal Grandfather        OBJECTIVE:     Visit Vitals  /80 (BP 1 Location: Right upper arm, BP Patient Position: Sitting, BP Cuff Size: Adult)   Pulse 80   Temp 98.8 °F (37.1 °C)   Resp 16   Ht 5' 2\" (1.575 m)   Wt 148 lb 14.4 oz (67.5 kg)   LMP  (LMP Unknown)   SpO2 98%   BMI 27.23 kg/m²     CONSTITUTIONAL:   well nourished, appears age appropriate  EYES: sclera anicteric, PERRL, EOMI  ENMT:nares clear, moist mucous membranes, pharynx clear  NECK: supple.  Thyroid normal, No JVD or bruits  RESPIRATORY: Chest: clear to ascultation and percussion, normal inspiratory effort  CARDIOVASCULAR: Heart: regular rate and rhythm no murmurs, rubs or gallops, PMI not displaced, No thrills, no peripheral edema  GASTROINTESTINAL: Abdomen: non distended, soft, non tender, bowel sounds normal  HEMATOLOGIC: no purpura, petechiae or bruising  LYMPHATIC: No lymph node enlargemant  MUSCULOSKELETAL: Extremities: no active synovitis, pulse 1+   INTEGUMENT: No unusual rashes or suspicious skin lesions noted. Nails appear normal.  PERIPHERAL VASCULAR: normal pulses femoral, PT and DP  NEUROLOGIC: non-focal exam, A & O X 3  PSYCHIATRIC:, appropriate affect     ASSESSMENT:   1. Anxiety    2. Depression, unspecified depression type    3. Palpitations      Impression  1. Anxiety stable continue as needed Xanax  2. Depression controlled with Effexor continue same  3. Palpitations not recently problem  Recheck scheduled again 3 months or sooner if there is a problem. PLAN:  . No orders of the defined types were placed in this encounter. ATTENTION:   This medical record was transcribed using an electronic medical records system. Although proofread, it may and can contain electronic and spelling errors. Other human spelling and other errors may be present. Corrections may be executed at a later time. Please feel free to contact us for any clarifications as needed. Follow-up and Dispositions    · Return in about 3 months (around 9/27/2022). No results found for any visits on 06/27/22. Keith Richards MD    The patient verbalized understanding of the problems and plans as explained.

## 2022-06-27 ENCOUNTER — OFFICE VISIT (OUTPATIENT)
Dept: INTERNAL MEDICINE CLINIC | Age: 22
End: 2022-06-27
Payer: MEDICAID

## 2022-06-27 VITALS
TEMPERATURE: 98.8 F | RESPIRATION RATE: 16 BRPM | WEIGHT: 148.9 LBS | SYSTOLIC BLOOD PRESSURE: 112 MMHG | BODY MASS INDEX: 27.4 KG/M2 | DIASTOLIC BLOOD PRESSURE: 80 MMHG | OXYGEN SATURATION: 98 % | HEART RATE: 80 BPM | HEIGHT: 62 IN

## 2022-06-27 DIAGNOSIS — R00.2 PALPITATIONS: ICD-10-CM

## 2022-06-27 DIAGNOSIS — F32.A DEPRESSION, UNSPECIFIED DEPRESSION TYPE: ICD-10-CM

## 2022-06-27 DIAGNOSIS — F41.9 ANXIETY: Primary | ICD-10-CM

## 2022-06-27 PROCEDURE — 99213 OFFICE O/P EST LOW 20 MIN: CPT | Performed by: INTERNAL MEDICINE

## 2022-06-27 NOTE — PROGRESS NOTES
Chief Complaint   Patient presents with    Anxiety     3 month f/up    Depression     1. Have you been to the ER, urgent care clinic since your last visit? Hospitalized since your last visit? No    2. Have you seen or consulted any other health care providers outside of the [address] [place] [address] since your last visit? Include any pap smears or colon screening.  No

## 2022-06-27 NOTE — PATIENT INSTRUCTIONS

## 2022-07-10 DIAGNOSIS — F41.9 ANXIETY: ICD-10-CM

## 2022-07-11 RX ORDER — ALPRAZOLAM 0.5 MG/1
0.5 TABLET ORAL
Qty: 50 TABLET | Refills: 0 | Status: SHIPPED | OUTPATIENT
Start: 2022-07-11 | End: 2022-10-14

## 2022-07-11 NOTE — TELEPHONE ENCOUNTER
RX refill request from the patient/pharmacy. Patient last seen 06- with labs, and next appt. scheduled for 09-  Requested Prescriptions     Pending Prescriptions Disp Refills    ALPRAZolam (XANAX) 0.5 mg tablet [Pharmacy Med Name: ALPRAZOLAM 0.5 MG TABLET] 50 Tablet 0     Sig: TAKE 1 TABLET BY MOUTH THREE (3) TIMES DAILY AS NEEDED FOR ANXIETY. MAX DAILY AMOUNT: 1.5 MG.    Montez Sarabia

## 2022-08-01 RX ORDER — TOPIRAMATE 25 MG/1
25 TABLET ORAL 2 TIMES DAILY
Qty: 180 TABLET | Refills: 3 | Status: SHIPPED | OUTPATIENT
Start: 2022-08-01

## 2022-08-01 NOTE — TELEPHONE ENCOUNTER
RX refill request from the patient/pharmacy. Patient last seen 06- with labs, and next appt. scheduled for 09-  Requested Prescriptions     Pending Prescriptions Disp Refills    topiramate (TOPAMAX) 25 mg tablet [Pharmacy Med Name: TOPIRAMATE 25 MG TABLET] 180 Tablet 3     Sig: TAKE 1 TABLET BY MOUTH TWO (2) TIMES A DAY. Melisa Andrew

## 2022-09-26 ENCOUNTER — OFFICE VISIT (OUTPATIENT)
Dept: INTERNAL MEDICINE CLINIC | Age: 22
End: 2022-09-26
Payer: MEDICAID

## 2022-09-26 VITALS
OXYGEN SATURATION: 97 % | HEART RATE: 89 BPM | WEIGHT: 150.3 LBS | HEIGHT: 62 IN | DIASTOLIC BLOOD PRESSURE: 70 MMHG | SYSTOLIC BLOOD PRESSURE: 102 MMHG | BODY MASS INDEX: 27.66 KG/M2 | TEMPERATURE: 98.3 F

## 2022-09-26 DIAGNOSIS — F32.A DEPRESSION, UNSPECIFIED DEPRESSION TYPE: ICD-10-CM

## 2022-09-26 DIAGNOSIS — R00.2 PALPITATIONS: ICD-10-CM

## 2022-09-26 DIAGNOSIS — E78.5 DYSLIPIDEMIA: ICD-10-CM

## 2022-09-26 DIAGNOSIS — G43.709 CHRONIC MIGRAINE WITHOUT AURA, NOT INTRACTABLE, WITHOUT STATUS MIGRAINOSUS: ICD-10-CM

## 2022-09-26 DIAGNOSIS — Z86.39 HISTORY OF HYPERTHYROIDISM: ICD-10-CM

## 2022-09-26 DIAGNOSIS — F41.9 ANXIETY: Primary | ICD-10-CM

## 2022-09-26 PROCEDURE — 99214 OFFICE O/P EST MOD 30 MIN: CPT | Performed by: INTERNAL MEDICINE

## 2022-09-26 RX ORDER — METOPROLOL TARTRATE 25 MG/1
TABLET, FILM COATED ORAL
Qty: 90 TABLET | Refills: 1 | Status: SHIPPED | OUTPATIENT
Start: 2022-09-26

## 2022-09-26 NOTE — PROGRESS NOTES
Chief Complaint   Patient presents with    Migraine    Anxiety     3 month followup    1. Have you been to the ER, urgent care clinic since your last visit? Hospitalized since your last visit?no    2. Have you seen or consulted any other health care providers outside of the [address] [place] [address] since your last visit? Include any pap smears or colon screening.  no

## 2022-09-26 NOTE — TELEPHONE ENCOUNTER
RX refill request from the patient/pharmacy. Patient last seen 09- with labs, and next appt. scheduled for 01-  Requested Prescriptions     Pending Prescriptions Disp Refills    metoprolol tartrate (LOPRESSOR) 25 mg tablet [Pharmacy Med Name: METOPROLOL TARTRATE 25 MG TAB] 90 Tablet 1     Sig: TAKE 1 TABLET BY MOUTH EVERY DAY AS NEEDED FOR HEART PALPITATIONS    .

## 2022-09-27 LAB
ALBUMIN SERPL-MCNC: 4.5 G/DL (ref 3.9–5)
ALBUMIN/GLOB SERPL: 1.9 {RATIO} (ref 1.2–2.2)
ALP SERPL-CCNC: 42 IU/L (ref 44–121)
ALT SERPL-CCNC: 11 IU/L (ref 0–32)
AST SERPL-CCNC: 18 IU/L (ref 0–40)
BILIRUB SERPL-MCNC: 0.2 MG/DL (ref 0–1.2)
BUN SERPL-MCNC: 12 MG/DL (ref 6–20)
BUN/CREAT SERPL: 21 (ref 9–23)
CALCIUM SERPL-MCNC: 9.2 MG/DL (ref 8.7–10.2)
CHLORIDE SERPL-SCNC: 105 MMOL/L (ref 96–106)
CHOLEST SERPL-MCNC: 147 MG/DL (ref 100–199)
CO2 SERPL-SCNC: 19 MMOL/L (ref 20–29)
CREAT SERPL-MCNC: 0.57 MG/DL (ref 0.57–1)
EGFR: 132 ML/MIN/1.73
GLOBULIN SER CALC-MCNC: 2.4 G/DL (ref 1.5–4.5)
GLUCOSE SERPL-MCNC: 102 MG/DL (ref 70–99)
HDLC SERPL-MCNC: 71 MG/DL
LDLC SERPL CALC-MCNC: 60 MG/DL (ref 0–99)
POTASSIUM SERPL-SCNC: 4.4 MMOL/L (ref 3.5–5.2)
PROT SERPL-MCNC: 6.9 G/DL (ref 6–8.5)
SODIUM SERPL-SCNC: 142 MMOL/L (ref 134–144)
TRIGL SERPL-MCNC: 86 MG/DL (ref 0–149)
TSH SERPL DL<=0.005 MIU/L-ACNC: 1.12 UIU/ML (ref 0.45–4.5)
VLDLC SERPL CALC-MCNC: 16 MG/DL (ref 5–40)

## 2022-09-28 NOTE — PROGRESS NOTES
Per provider instructions, contacted patient with results: Labs stable no changes needed. Lab letter sent to patient.

## 2022-10-13 DIAGNOSIS — F41.9 ANXIETY: ICD-10-CM

## 2022-10-14 RX ORDER — ALPRAZOLAM 0.5 MG/1
TABLET ORAL
Qty: 50 TABLET | Refills: 0 | Status: SHIPPED | OUTPATIENT
Start: 2022-10-14

## 2022-10-14 NOTE — TELEPHONE ENCOUNTER
RX refill request from the patient/pharmacy. Patient last seen 09- with labs, and next appt. scheduled for 01-  Requested Prescriptions     Pending Prescriptions Disp Refills    ALPRAZolam (XANAX) 0.5 mg tablet [Pharmacy Med Name: ALPRAZOLAM 0.5 MG TABLET] 50 Tablet 0     Sig: TAKE 1 TABLET BY MOUTH THREE TIMES DAILY AS NEEDED FOR ANXIETY    .

## 2022-12-05 ENCOUNTER — E-VISIT (OUTPATIENT)
Dept: INTERNAL MEDICINE CLINIC | Age: 22
End: 2022-12-05

## 2022-12-05 DIAGNOSIS — J30.9 ALLERGIC RHINITIS, UNSPECIFIED SEASONALITY, UNSPECIFIED TRIGGER: Primary | ICD-10-CM

## 2022-12-09 ENCOUNTER — OFFICE VISIT (OUTPATIENT)
Dept: INTERNAL MEDICINE CLINIC | Age: 22
End: 2022-12-09
Payer: MEDICAID

## 2022-12-09 VITALS
SYSTOLIC BLOOD PRESSURE: 104 MMHG | HEIGHT: 62 IN | WEIGHT: 154.5 LBS | OXYGEN SATURATION: 98 % | HEART RATE: 76 BPM | TEMPERATURE: 99 F | BODY MASS INDEX: 28.43 KG/M2 | DIASTOLIC BLOOD PRESSURE: 80 MMHG

## 2022-12-09 DIAGNOSIS — J01.00 ACUTE NON-RECURRENT MAXILLARY SINUSITIS: Primary | ICD-10-CM

## 2022-12-09 PROCEDURE — 99213 OFFICE O/P EST LOW 20 MIN: CPT | Performed by: INTERNAL MEDICINE

## 2022-12-09 RX ORDER — AZITHROMYCIN 250 MG/1
250 TABLET, FILM COATED ORAL SEE ADMIN INSTRUCTIONS
Qty: 6 TABLET | Refills: 0 | Status: SHIPPED | OUTPATIENT
Start: 2022-12-09 | End: 2022-12-14

## 2022-12-09 NOTE — PROGRESS NOTES
Chief Complaint   Patient presents with    Sinus Pain    Nasal Congestion    Ear Pain    Cough      1. Have you been to the ER, urgent care clinic since your last visit? Hospitalized since your last visit? Went to [address] last week and was tested for flu and strep which were both negative. 2. Have you seen or consulted any other health care providers outside of the [address] [place] [address] since your last visit? Include any pap smears or colon screening.  No

## 2022-12-09 NOTE — PROGRESS NOTES
Subjective:   Maine Abreu is a 20 y.o. female      Chief Complaint   Patient presents with    Sinus Pain    Nasal Congestion    Ear Pain    Cough        History of present illness: She presents complain a lot of head and nasal congestion that has been present now for about 10 days. She actually about 6 days ago went to urgent care/ER [address] and they told her that human door flu test was negative and her strep test was negative she probably had flu and they recommended over-the-counter treatment. That has not been effective and she continues with purulent sputum. She notes no fevers or chills. She has not tested herself for COVID but her symptoms have now been present for 10 days. 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 A    Paresthesia of right leg R20.2    COVID-19 U07.1      Past Medical History:   Diagnosis Date    Anxiety     Depression     mild/ Anxiety    Palpitations       Allergies   Allergen Reactions    Peppermint Hives      Family History   Problem Relation Age of Onset    No Known Problems Mother     No Known Problems Father     Asthma Sister     Diabetes Maternal Grandmother     Diabetes Maternal Grandfather     Diabetes Paternal Grandmother     Diabetes Paternal Grandfather       Social History     Socioeconomic History    Marital status: SINGLE     Spouse name: Not on file    Number of children: Not on file    Years of education: Not on file    Highest education level: Not on file   Occupational History    Not on file   Tobacco Use    Smoking status: Former     Packs/day: 0.25     Types: Cigarettes     Quit date: 2020     Years since quittin.1    Smokeless tobacco: Never    Tobacco comments: smokes 1 cigarette a day   Vaping Use    Vaping Use: Some days    Start date: 8/8/2019    Substances: Nicotine    Devices: Pre-filled pod   Substance and Sexual Activity    Alcohol use: No    Drug use: No    Sexual activity: Never     Birth control/protection: Injection   Other Topics Concern    Not on file   Social History Narrative    Not on file     Social Determinants of Health     Financial Resource Strain: Not on file   Food Insecurity: Not on file   Transportation Needs: Not on file   Physical Activity: Not on file   Stress: Not on file   Social Connections: Not on file   Intimate Partner Violence: Not on file   Housing Stability: Not on file     Prior to Admission medications    Medication Sig Start Date End Date Taking? Authorizing Provider   azithromycin (ZITHROMAX) 250 mg tablet Take 1 Tablet by mouth See Admin Instructions for 5 days. Take 2 tablets the first day, then 1 tablet daily for the next four days. 12/9/22 12/14/22 Yes Andreia Asif MD   ALPRAZolam Jeniffer Tanvi) 0.5 mg tablet TAKE 1 TABLET BY MOUTH THREE TIMES DAILY AS NEEDED FOR ANXIETY 10/14/22  Yes Andreia Asif MD   metoprolol tartrate (LOPRESSOR) 25 mg tablet TAKE 1 TABLET BY MOUTH EVERY DAY AS NEEDED FOR HEART PALPITATIONS 9/26/22  Yes Andreia Asif MD   topiramate (TOPAMAX) 25 mg tablet TAKE 1 TABLET BY MOUTH TWO (2) TIMES A DAY. 8/1/22  Yes Andreia Asif MD   tiZANidine (ZANAFLEX) 4 mg tablet TAKE 1 TABLET BY MOUTH EVERY DAY AT NIGHT 6/19/22  Yes Tita Johns MD   venlafaxine-SR [facility] 75 mg capsule Take 1 Capsule by mouth daily. 4/27/22  Yes Andreia Asif MD        Review of Systems              Constitutional:  She denies fever, weight loss, sweats or fatigue. EYES: No blurred or double vision,               ENT: Postnasal drainage from head and nasal congestion, no headache or dizziness. No difficulty with               swallowing, taste, speech or smell.   Respiratory:  No cough, wheezing or shortness of breath. No sputum production. Cardiac:  Denies chest pain, palpitations, unexplained indigestion, syncope, edema, PND or orthopnea. GI:  No changes in bowel movements, no abdominal pain, no bloating, anorexia, nausea, vomiting or heartburn. :  No frequency or dysuria. Denies incontinence or sexual dysfunction. Extremities:  No joint pain, stiffness or swelling  Back:.no pain or soreness  Skin:  No recent rashes or mole changes. Neurological:  No numbness, tingling, burning paresthesias or loss of motor strength. No syncope, dizziness, frequent headaches or memory loss. Hematologic:  No easy bruising  Lymphatic: No lymph node enlargement    Objective:     Vitals:    12/09/22 1342   BP: 104/80   Pulse: 76   Temp: 99 °F (37.2 °C)   SpO2: 98%   Weight: 154 lb 8 oz (70.1 kg)   Height: 5' 2\" (1.575 m)   PainSc:   1   PainLoc: Ear       Body mass index is 28.26 kg/m². Physical Examination:              General Appearance:  Well-developed, well-nourished, no acute distress. HEENT:      Ears:  The TMs and ear canals were clear. Eyes:  The pupillary responses were normal.  Extraocular muscle function intact. Lids and conjunctiva not injected. Funduscopic exam revealed sharp disc margins. Nares: Inflamed and edematous  Pharynx:  Clear with teeth in good repair. No masses were noted. Neck:  Supple without thyromegaly or adenopathy. No JVD noted. No carotid                bruits. Lungs:  Clear to auscultation and percussion. Cardiac:  Regular rate and rhythm without murmur. PMI not displaced. No gallop, rub or click. Abdominal: Soft, non-tender, no hepata-spleenomegally or masses  Extremities:  No clubbing, cyanosis or edema. Skin:  No rash or unusual mole changes noted. Lymph Nodes:  None felt in the cervical, supraclavicular, axillary or inguinal region. Neurological: . DTRs 2+ and symmetric.   Station and gait normal.   Hematologic:   No purpura or petechiae        Assessment/Plan:         1. Acute non-recurrent maxillary sinusitis        Impressions/Plan:  Impression  1. Acute maxillary sinusitis we will treat with Zithromax  Recheck if not resolved. Orders Placed This Encounter    azithromycin (ZITHROMAX) 250 mg tablet       Follow-up and Dispositions    Return At prior scheduled appointment. No results found for any visits on 12/09/22. Sonjia Cooks, MD    The patient was given after the visit summary the patient verbalized an understanding of the plans and problems as explained.

## 2023-01-17 DIAGNOSIS — F41.9 ANXIETY: ICD-10-CM

## 2023-01-18 RX ORDER — ALPRAZOLAM 0.5 MG/1
TABLET ORAL
Qty: 50 TABLET | Refills: 0 | Status: SHIPPED | OUTPATIENT
Start: 2023-01-18

## 2023-03-16 ENCOUNTER — OFFICE VISIT (OUTPATIENT)
Dept: NEUROLOGY | Age: 23
End: 2023-03-16
Payer: MEDICAID

## 2023-03-16 VITALS
DIASTOLIC BLOOD PRESSURE: 60 MMHG | BODY MASS INDEX: 28.05 KG/M2 | SYSTOLIC BLOOD PRESSURE: 90 MMHG | RESPIRATION RATE: 16 BRPM | WEIGHT: 152.4 LBS | HEART RATE: 100 BPM | OXYGEN SATURATION: 98 % | TEMPERATURE: 98.2 F | HEIGHT: 62 IN

## 2023-03-16 DIAGNOSIS — H53.8 BLURRY VISION: ICD-10-CM

## 2023-03-16 DIAGNOSIS — R42 DIZZINESS: ICD-10-CM

## 2023-03-16 DIAGNOSIS — G43.709 CHRONIC MIGRAINE WITHOUT AURA, NOT INTRACTABLE, WITHOUT STATUS MIGRAINOSUS: ICD-10-CM

## 2023-03-16 DIAGNOSIS — G44.219 EPISODIC TENSION-TYPE HEADACHE, NOT INTRACTABLE: Primary | ICD-10-CM

## 2023-03-16 PROCEDURE — 99214 OFFICE O/P EST MOD 30 MIN: CPT | Performed by: NURSE PRACTITIONER

## 2023-03-16 RX ORDER — BUTALBITAL, ACETAMINOPHEN AND CAFFEINE 50; 325; 40 MG/1; MG/1; MG/1
1 TABLET ORAL
Qty: 15 TABLET | Refills: 2 | Status: SHIPPED | OUTPATIENT
Start: 2023-03-16

## 2023-03-16 NOTE — PROGRESS NOTES
Chief Complaint   Patient presents with    Follow-up     Localized head pressure/ headache, brain fog, blurred vision      1. Have you been to the ER, urgent care clinic since your last visit? UC  & ED  Hospitalized since your last visit? No    2. Have you seen or consulted any other health care providers outside of the [address] [place] [address] since your last visit? Eye exam  Include any pap smears or colon screening.  GYN

## 2023-03-16 NOTE — PATIENT INSTRUCTIONS
Topamax 25MG: continue 1 in the morning, increase to 2 at night for 1-2 weeks, if tolerating, go ahead and increase to 2 in the morning and 2 at night. Hopefully this will help with these intense head pains you are experiencing. I am also ordering a head CT, they will call you to schedule this. Fioricet to be taken ONLY when the pain is severe.
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
## 2023-04-03 ENCOUNTER — HOSPITAL ENCOUNTER (OUTPATIENT)
Dept: CT IMAGING | Age: 23
End: 2023-04-03
Attending: NURSE PRACTITIONER
Payer: MEDICAID

## 2023-04-03 DIAGNOSIS — R42 DIZZINESS: ICD-10-CM

## 2023-04-03 DIAGNOSIS — G43.709 CHRONIC MIGRAINE WITHOUT AURA, NOT INTRACTABLE, WITHOUT STATUS MIGRAINOSUS: ICD-10-CM

## 2023-04-03 DIAGNOSIS — H53.8 BLURRY VISION: ICD-10-CM

## 2023-04-03 DIAGNOSIS — G44.219 EPISODIC TENSION-TYPE HEADACHE, NOT INTRACTABLE: ICD-10-CM

## 2023-04-03 PROCEDURE — 70450 CT HEAD/BRAIN W/O DYE: CPT

## 2023-04-25 RX ORDER — VENLAFAXINE HYDROCHLORIDE 75 MG/1
CAPSULE, EXTENDED RELEASE ORAL
Qty: 90 CAPSULE | Refills: 1 | Status: SHIPPED | OUTPATIENT
Start: 2023-04-25 | End: 2023-04-28 | Stop reason: SDUPTHER

## 2023-04-25 NOTE — TELEPHONE ENCOUNTER
RX refill request from the patient/pharmacy. Patient last seen 12- with labs, and can not see the next scheduled appointment.   Requested Prescriptions     Pending Prescriptions Disp Refills    venlafaxine-SR (EFFEXOR-XR) 75 mg capsule [Pharmacy Med Name: VENLAFAXINE HCL ER 75 MG CAP] 90 Capsule 1     Sig: TAKE 1 CAPSULE BY MOUTH EVERY DAY

## 2023-04-28 ENCOUNTER — OFFICE VISIT (OUTPATIENT)
Dept: INTERNAL MEDICINE CLINIC | Age: 23
End: 2023-04-28
Payer: MEDICAID

## 2023-04-28 VITALS
TEMPERATURE: 98.7 F | BODY MASS INDEX: 27.79 KG/M2 | OXYGEN SATURATION: 98 % | HEIGHT: 62 IN | SYSTOLIC BLOOD PRESSURE: 126 MMHG | WEIGHT: 151 LBS | HEART RATE: 84 BPM | DIASTOLIC BLOOD PRESSURE: 86 MMHG

## 2023-04-28 DIAGNOSIS — F32.A DEPRESSION, UNSPECIFIED DEPRESSION TYPE: ICD-10-CM

## 2023-04-28 DIAGNOSIS — R00.2 PALPITATIONS: ICD-10-CM

## 2023-04-28 DIAGNOSIS — Z86.39 HISTORY OF HYPERTHYROIDISM: ICD-10-CM

## 2023-04-28 DIAGNOSIS — F41.9 ANXIETY: ICD-10-CM

## 2023-04-28 DIAGNOSIS — Z00.00 ANNUAL PHYSICAL EXAM: Primary | ICD-10-CM

## 2023-04-28 PROCEDURE — 99395 PREV VISIT EST AGE 18-39: CPT | Performed by: INTERNAL MEDICINE

## 2023-04-28 RX ORDER — VENLAFAXINE HYDROCHLORIDE 150 MG/1
150 CAPSULE, EXTENDED RELEASE ORAL DAILY
Qty: 30 CAPSULE | Refills: 5 | Status: SHIPPED | OUTPATIENT
Start: 2023-04-28

## 2023-04-28 NOTE — PROGRESS NOTES
Annual Exam       HPI:     Loly Hudson is a 20y.o. year old female who is here for her annual comprehensive healthcare exam and follow-up of her medical problems include chronic migraine headaches as well as tension vascular headaches, history of palpitations, anxiety along with depression and other medical problems. She does note that she had gone to the point where she was drinking excessively and alcohol but that she stopped drinking alcohol. She currently denies any chest pain, shortness of breath, palpitations, PND, orthopnea or other cardiac respiratory complaints. She notes no GI or  complaints. She notes no headaches, dizziness or neurologic complaints. She has no current active arthritic complaints and there are no other complaints noted. Visit Vitals  /86   Pulse 84   Temp 98.7 °F (37.1 °C)   Ht 5' 2\" (1.575 m)   Wt 151 lb (68.5 kg)   LMP  (LMP Unknown)   SpO2 98%   BMI 27.62 kg/m²       Past Medical History:   Diagnosis Date    Anxiety     Depression     mild/ Anxiety    Migraines 2014    Palpitations        History reviewed. No pertinent surgical history. Prior to Admission medications    Medication Sig Start Date End Date Taking? Authorizing Provider   venlafaxine-SR [facility].) 150 mg capsule Take 1 Capsule by mouth daily.  4/28/23  Yes Tasia Leon MD   ALPRAZolam [facility] Sages) 0.5 mg tablet TAKE 1 TABLET BY MOUTH THREE TIMES A DAY AS NEEDED FOR ANXIETY 4/10/23  Yes Tasia Leon MD   butalbital-acetaminophen-caffeine (FIORICET, ESGIC) -40 mg per tablet Take 1 Tablet by mouth every six (6) hours as needed for Headache. 3/16/23  Yes Ami Guzman NP   metoprolol tartrate (LOPRESSOR) 25 mg tablet TAKE 1 TABLET BY MOUTH EVERY DAY AS NEEDED FOR HEART PALPITATIONS 9/26/22  Yes Tasia Leon MD   topiramate (TOPAMAX) 25 mg tablet TAKE 1 TABLET BY MOUTH TWO (2) TIMES A DAY. 8/1/22  Yes Tasia Loen MD   tiZANidine (ZANAFLEX) 4 mg tablet TAKE 1 TABLET BY MOUTH EVERY DAY AT NIGHT  Patient taking differently: 1 Tablet. TAKE 1 TABLET BY MOUTH EVERY DAY AT NIGHT PRN 22  Yes Byron Simpson MD        Allergies   Allergen Reactions    Peppermint Hives        Family History   Problem Relation Age of Onset    No Known Problems Mother     No Known Problems Father     Asthma Sister     Diabetes Maternal Grandmother     Diabetes Maternal Grandfather     Diabetes Paternal Grandmother     Diabetes Paternal Grandfather        Social History     Socioeconomic History    Marital status: SINGLE     Spouse name: Not on file    Number of children: Not on file    Years of education: Not on file    Highest education level: Not on file   Occupational History    Not on file   Tobacco Use    Smoking status: Former     Packs/day: 0.25     Types: Cigarettes     Quit date: 2020     Years since quittin.4    Smokeless tobacco: Never    Tobacco comments:     smokes 1 cigarette a day   Vaping Use    Vaping Use: Some days    Start date: 2019    Substances: Nicotine, Flavoring    Devices: Pre-filled pod   Substance and Sexual Activity    Alcohol use: No    Drug use: No    Sexual activity: Never     Birth control/protection: Injection   Other Topics Concern    Not on file   Social History Narrative    Not on file     Social Determinants of Health     Financial Resource Strain: Not on file   Food Insecurity: Not on file   Transportation Needs: Not on file   Physical Activity: Not on file   Stress: Not on file   Social Connections: Not on file   Intimate Partner Violence: Not on file   Housing Stability: Not on file        ROS:     Constitutional: She denies fevers, weight loss, sweats. Eyes: No blurred or double vision. ENT: No difficulty with swallowing, taste, speech or smell. NECK: no stiffness swelling or lymph node enlargement  Respiratory: No cough wheezing or shortness of breath.   Cardiovascular: Denies chest pain, palpitations, unexplained indigestion or syncope. Breast: She has noted no masses or lumps and no discharge or axillary swelling  Gastrointestinal:  No changes in bowel movements, no abdominal pain, no bloating. Genitourinary: No discharge or abnormal bleeding or pain  Extremities: No joint pain, stiffness or swelling. Neurological:  No numbness, tingling, burring paresthesias or loss of motor strength. No syncope, dizziness or frequent headache  Skin:  No recent rashes or mole changes. Psychiatric/Behavioral:  Negative for depression. The patient is not nervous/anxious. HEMATOLOGIC: no easy bruising or bleeding gums  Endocrine: no sweats of urinary frequency or excessive thirst      Physical Examination:     Vitals:    04/28/23 1528 04/28/23 1558   BP: (!) 126/92 126/86   Pulse: 90 84   Temp: 98.7 °F (37.1 °C)    SpO2: 98%    Weight: 151 lb (68.5 kg)    Height: 5' 2\" (1.575 m)    PainSc:   0 - No pain         General appearance - alert, well appearing, and in no distress  Mental status - alert, oriented to person, place, and time  HEENT:  Ears - bilateral TM's and external ear canals clear  Eyes - pupillary responses were normal.  Extraocular muscle function intact. Lids and conjunctiva not injected. Fundoscopic exam revealed sharp disc margins. eye movements intact  Pharynx- clear with teeth in good repair. No masses were noted  Neck - supple without thyromegaly or burit. No JVD noted  Lungs - clear to auscultation and percussion  Cardiac- normal rate, regular rhythm without murmurs. PMI not displaced. No gallop, rub or click  Breast: deferred to GYN  Abdomen - flat, soft, non-tender without palpable organomegaly or mass. No pulsatile mass was felt, and not bruit was heard.   Bowel sounds were active   Female - deferred to GYN  Rectal - deferred to GYN  Extremities -  no clubbing cyanosis or edema  Lymphatics - no palpable lymphadenopathy, no hepatosplenomegaly  Peripheral vascular - Dorsalis pedis and posterior tibial pulses felt without difficulty  Skin - no rash or unusual mole change noted  Neurological - Cranial nerves II-XII grossly intact. Motor strength 5/5. DTR's 2+ and symmetric. Station and gait normal  Back exam - full range of motion, no tenderness, palpable spasm or pain on motion  Musculoskeletal - no joint tenderness, deformity or swelling  Hematologic: no purpura, petechiae or bruising    Assessment/Plan:     1. Annual physical exam    2. History of hyperthyroidism    3. Palpitations    4. Anxiety    5. Depression, unspecified depression type        Impression  1 annual physical examination is normal  2. History of hyperthyroidism we will check thyroid status  3 palpitations controlled metoprolol continue current dose pulse rate is good  4. Anxiety seems to be increased a little  5. Depression not Effexor to 150 mg a day. Follow-up again in 3 months or sooner if there is a problem. I will call with lab results in the interim. Orders Placed This Encounter    CBC WITH AUTOMATED DIFF     Standing Status:   Future     Standing Expiration Date:   5/44/7457    METABOLIC PANEL, COMPREHENSIVE     Standing Status:   Future     Standing Expiration Date:   4/27/2024    T4 (THYROXINE)     Standing Status:   Future     Standing Expiration Date:   4/27/2024    TSH 3RD GENERATION     Standing Status:   Future     Standing Expiration Date:   4/27/2024    URINALYSIS W/ REFLEX CULTURE     Standing Status:   Future     Standing Expiration Date:   4/27/2024    LIPID PANEL     Standing Status:   Future     Standing Expiration Date:   4/27/2024    venlafaxine-SR (EFFEXOR-XR) 150 mg capsule     Sig: Take 1 Capsule by mouth daily. Dispense:  30 Capsule     Refill:  5        No results found for any visits on 04/28/23. I have reviewed the patient's medical history in detail and updated the computerized patient record.      We had a prolonged discussion about these complex clinical issues and went over the various important aspects to consider. All questions were answered. Advised her to call back or return to office if symptoms do not improve, change in nature, or persist.    She was given an after visit summary or informed of [employer] which includes patient instructions, diagnoses, current medications, & vitals. She expressed understanding with the diagnosis and plan.       Hua Son MD

## 2023-04-28 NOTE — PROGRESS NOTES
Chief Complaint   Patient presents with    Annual Exam      1. Have you been to the ER, urgent care clinic since your last visit? Hospitalized since your last visit? Head CT for evaluation of headaches    2. Have you seen or consulted any other health care providers outside of the [address] [place] [address] since your last visit? Include any pap smears or colon screening. Recently seen at neurology for increased frequency of Headaches.

## 2023-04-29 LAB
ALBUMIN SERPL-MCNC: 5 G/DL (ref 3.9–5)
ALBUMIN/GLOB SERPL: 1.8 {RATIO} (ref 1.2–2.2)
ALP SERPL-CCNC: 46 IU/L (ref 44–121)
ALT SERPL-CCNC: 13 IU/L (ref 0–32)
APPEARANCE UR: CLEAR
AST SERPL-CCNC: 18 IU/L (ref 0–40)
BACTERIA #/AREA URNS HPF: NORMAL /[HPF]
BASOPHILS # BLD AUTO: 0.1 X10E3/UL (ref 0–0.2)
BASOPHILS NFR BLD AUTO: 1 %
BILIRUB SERPL-MCNC: 0.3 MG/DL (ref 0–1.2)
BILIRUB UR QL STRIP: NEGATIVE
BUN SERPL-MCNC: 10 MG/DL (ref 6–20)
BUN/CREAT SERPL: 14 (ref 9–23)
CALCIUM SERPL-MCNC: 9.6 MG/DL (ref 8.7–10.2)
CASTS URNS QL MICRO: NORMAL /LPF
CHLORIDE SERPL-SCNC: 102 MMOL/L (ref 96–106)
CHOLEST SERPL-MCNC: 132 MG/DL (ref 100–199)
CO2 SERPL-SCNC: 20 MMOL/L (ref 20–29)
COLOR UR: YELLOW
CREAT SERPL-MCNC: 0.71 MG/DL (ref 0.57–1)
EGFRCR SERPLBLD CKD-EPI 2021: 122 ML/MIN/1.73
EOSINOPHIL # BLD AUTO: 0.1 X10E3/UL (ref 0–0.4)
EOSINOPHIL NFR BLD AUTO: 2 %
EPI CELLS #/AREA URNS HPF: NORMAL /HPF (ref 0–10)
ERYTHROCYTE [DISTWIDTH] IN BLOOD BY AUTOMATED COUNT: 12.4 % (ref 11.7–15.4)
GLOBULIN SER CALC-MCNC: 2.8 G/DL (ref 1.5–4.5)
GLUCOSE SERPL-MCNC: 89 MG/DL (ref 70–99)
GLUCOSE UR QL STRIP: NEGATIVE
HCT VFR BLD AUTO: 39.9 % (ref 34–46.6)
HDLC SERPL-MCNC: 59 MG/DL
HGB BLD-MCNC: 13.7 G/DL (ref 11.1–15.9)
HGB UR QL STRIP: NEGATIVE
IMM GRANULOCYTES # BLD AUTO: 0 X10E3/UL (ref 0–0.1)
IMM GRANULOCYTES NFR BLD AUTO: 0 %
KETONES UR QL STRIP: NEGATIVE
LDLC SERPL CALC-MCNC: 60 MG/DL (ref 0–99)
LEUKOCYTE ESTERASE UR QL STRIP: NEGATIVE
LYMPHOCYTES # BLD AUTO: 2.3 X10E3/UL (ref 0.7–3.1)
LYMPHOCYTES NFR BLD AUTO: 36 %
MCH RBC QN AUTO: 31.4 PG (ref 26.6–33)
MCHC RBC AUTO-ENTMCNC: 34.3 G/DL (ref 31.5–35.7)
MCV RBC AUTO: 91 FL (ref 79–97)
MICRO URNS: NORMAL
MICRO URNS: NORMAL
MONOCYTES # BLD AUTO: 0.6 X10E3/UL (ref 0.1–0.9)
MONOCYTES NFR BLD AUTO: 9 %
NEUTROPHILS # BLD AUTO: 3.4 X10E3/UL (ref 1.4–7)
NEUTROPHILS NFR BLD AUTO: 52 %
NITRITE UR QL STRIP: NEGATIVE
PH UR STRIP: 6 [PH] (ref 5–7.5)
PLATELET # BLD AUTO: 284 X10E3/UL (ref 150–450)
POTASSIUM SERPL-SCNC: 4 MMOL/L (ref 3.5–5.2)
PROT SERPL-MCNC: 7.8 G/DL (ref 6–8.5)
PROT UR QL STRIP: NEGATIVE
RBC # BLD AUTO: 4.37 X10E6/UL (ref 3.77–5.28)
RBC #/AREA URNS HPF: NORMAL /HPF (ref 0–2)
SODIUM SERPL-SCNC: 138 MMOL/L (ref 134–144)
SP GR UR STRIP: 1.01 (ref 1–1.03)
T4 SERPL-MCNC: 6.4 UG/DL (ref 4.5–12)
TRIGL SERPL-MCNC: 63 MG/DL (ref 0–149)
TSH SERPL DL<=0.005 MIU/L-ACNC: 0.62 UIU/ML (ref 0.45–4.5)
URINALYSIS REFLEX, 377202: NORMAL
UROBILINOGEN UR STRIP-MCNC: 0.2 MG/DL (ref 0.2–1)
VLDLC SERPL CALC-MCNC: 13 MG/DL (ref 5–40)
WBC # BLD AUTO: 6.5 X10E3/UL (ref 3.4–10.8)
WBC #/AREA URNS HPF: NORMAL /HPF (ref 0–5)

## 2023-05-27 PROBLEM — Z00.00 ANNUAL PHYSICAL EXAM: Status: RESOLVED | Noted: 2020-01-28 | Resolved: 2023-05-27

## 2023-08-22 DIAGNOSIS — F41.9 ANXIETY DISORDER, UNSPECIFIED: ICD-10-CM

## 2023-08-23 RX ORDER — ALPRAZOLAM 0.5 MG/1
TABLET ORAL
Qty: 50 TABLET | Refills: 0 | Status: SHIPPED | OUTPATIENT
Start: 2023-08-23 | End: 2023-09-22

## 2023-08-23 NOTE — TELEPHONE ENCOUNTER
RX refill request from the patient/pharmacy. Patient last seen 04- with labs, and next appt. scheduled for 09-  Requested Prescriptions     Pending Prescriptions Disp Refills    ALPRAZolam (XANAX) 0.5 MG tablet [Pharmacy Med Name: ALPRAZOLAM 0.5 MG TABLET] 50 tablet 0     Sig: TAKE 1 TABLET BY MOUTH THREE TIMES A DAY AS NEEDED FOR ANXIETY    .

## 2023-11-26 DIAGNOSIS — F41.9 ANXIETY DISORDER, UNSPECIFIED: ICD-10-CM

## 2023-11-27 RX ORDER — ALPRAZOLAM 0.5 MG/1
TABLET ORAL
Qty: 50 TABLET | Refills: 0 | OUTPATIENT
Start: 2023-11-27 | End: 2023-12-27

## 2023-11-27 NOTE — TELEPHONE ENCOUNTER
RX refill request from the patient/pharmacy. Patient last seen 04- with labs, and next appt. scheduled for 11-  Requested Prescriptions     Pending Prescriptions Disp Refills    ALPRAZolam (XANAX) 0.5 MG tablet [Pharmacy Med Name: ALPRAZOLAM 0.5 MG TABLET] 50 tablet 0     Sig: TAKE 1 TABLET BY MOUTH THREE TIMES A DAY AS NEEDED FOR ANXIETY    .

## 2023-12-01 ENCOUNTER — OFFICE VISIT (OUTPATIENT)
Facility: CLINIC | Age: 23
End: 2023-12-01
Payer: MEDICAID

## 2023-12-01 VITALS
RESPIRATION RATE: 18 BRPM | WEIGHT: 160.2 LBS | HEART RATE: 102 BPM | OXYGEN SATURATION: 98 % | HEIGHT: 62 IN | SYSTOLIC BLOOD PRESSURE: 126 MMHG | TEMPERATURE: 98.7 F | BODY MASS INDEX: 29.48 KG/M2 | DIASTOLIC BLOOD PRESSURE: 86 MMHG

## 2023-12-01 DIAGNOSIS — G43.709 CHRONIC MIGRAINE WITHOUT AURA, NOT INTRACTABLE, WITHOUT STATUS MIGRAINOSUS: ICD-10-CM

## 2023-12-01 DIAGNOSIS — F32.A DEPRESSION, UNSPECIFIED DEPRESSION TYPE: ICD-10-CM

## 2023-12-01 DIAGNOSIS — J30.9 ALLERGIC RHINITIS, UNSPECIFIED SEASONALITY, UNSPECIFIED TRIGGER: ICD-10-CM

## 2023-12-01 DIAGNOSIS — F41.9 ANXIETY: Primary | ICD-10-CM

## 2023-12-01 DIAGNOSIS — Z86.39 HISTORY OF HYPERTHYROIDISM: ICD-10-CM

## 2023-12-01 PROCEDURE — 99214 OFFICE O/P EST MOD 30 MIN: CPT | Performed by: INTERNAL MEDICINE

## 2023-12-01 RX ORDER — VENLAFAXINE HYDROCHLORIDE 150 MG/1
150 CAPSULE, EXTENDED RELEASE ORAL DAILY
Qty: 30 CAPSULE | Refills: 5 | Status: SHIPPED | OUTPATIENT
Start: 2023-12-01

## 2023-12-01 RX ORDER — ALPRAZOLAM 0.5 MG/1
1 TABLET ORAL 3 TIMES DAILY PRN
COMMUNITY
Start: 2023-04-10

## 2023-12-01 SDOH — ECONOMIC STABILITY: FOOD INSECURITY: WITHIN THE PAST 12 MONTHS, YOU WORRIED THAT YOUR FOOD WOULD RUN OUT BEFORE YOU GOT MONEY TO BUY MORE.: NEVER TRUE

## 2023-12-01 SDOH — ECONOMIC STABILITY: INCOME INSECURITY: HOW HARD IS IT FOR YOU TO PAY FOR THE VERY BASICS LIKE FOOD, HOUSING, MEDICAL CARE, AND HEATING?: NOT VERY HARD

## 2023-12-01 SDOH — ECONOMIC STABILITY: TRANSPORTATION INSECURITY
IN THE PAST 12 MONTHS, HAS LACK OF TRANSPORTATION KEPT YOU FROM MEETINGS, WORK, OR FROM GETTING THINGS NEEDED FOR DAILY LIVING?: NO

## 2023-12-01 SDOH — ECONOMIC STABILITY: FOOD INSECURITY: WITHIN THE PAST 12 MONTHS, THE FOOD YOU BOUGHT JUST DIDN'T LAST AND YOU DIDN'T HAVE MONEY TO GET MORE.: NEVER TRUE

## 2023-12-01 SDOH — ECONOMIC STABILITY: HOUSING INSECURITY
IN THE LAST 12 MONTHS, WAS THERE A TIME WHEN YOU DID NOT HAVE A STEADY PLACE TO SLEEP OR SLEPT IN A SHELTER (INCLUDING NOW)?: NO

## 2023-12-01 ASSESSMENT — PATIENT HEALTH QUESTIONNAIRE - PHQ9
1. LITTLE INTEREST OR PLEASURE IN DOING THINGS: 0
SUM OF ALL RESPONSES TO PHQ QUESTIONS 1-9: 0
SUM OF ALL RESPONSES TO PHQ9 QUESTIONS 1 & 2: 0
SUM OF ALL RESPONSES TO PHQ QUESTIONS 1-9: 0
2. FEELING DOWN, DEPRESSED OR HOPELESS: 0
SUM OF ALL RESPONSES TO PHQ QUESTIONS 1-9: 0
SUM OF ALL RESPONSES TO PHQ QUESTIONS 1-9: 0

## 2023-12-01 NOTE — PROGRESS NOTES
Chief Complaint   Patient presents with    Follow-up       SUBJECTIVE:    Rebecca Ruiz is a 24 y.o. female who returns in follow-up regarding her anxiety/depression as well as her history with migraine and tension vascular headaches and she has now developed problems a lot of head and nasal congestion which she has more now that she has had ever before. She notes that all the drainage is clear but does have a lot of nasal sniffling. She notes no fevers or chills. She denies any significant cough or chest congestion. She notes no chest pain, palpitations, PND, orthopnea or other cardiorespiratory complaints as the metoprolol is helping the palpitations quite a bit. She notes no GI or  complaints. She notes that the headaches are much better now taking the Topamax on a regular basis. Her anxiety is much better controlled with the Effexor increased to 150 mg. There are no other complaints on complete review of systems. Current Outpatient Medications   Medication Sig Dispense Refill    ALPRAZolam (XANAX) 0.5 MG tablet Take 1 tablet by mouth 3 times daily as needed. venlafaxine (EFFEXOR XR) 150 MG extended release capsule Take 1 capsule by mouth daily 30 capsule 5    butalbital-acetaminophen-caffeine (FIORICET, ESGIC) -40 MG per tablet Take 1 tablet by mouth every 6 hours as needed      metoprolol tartrate (LOPRESSOR) 25 MG tablet TAKE 1 TABLET BY MOUTH EVERY DAY AS NEEDED FOR HEART PALPITATIONS      topiramate (TOPAMAX) 25 MG tablet Take 1 tablet by mouth 2 times daily       No current facility-administered medications for this visit. Past Medical History:   Diagnosis Date    Anxiety     Depression     mild/ Anxiety    Migraines 2014    Palpitations      History reviewed. No pertinent surgical history.   Allergies   Allergen Reactions    Peppermint Oil Hives       REVIEW OF SYSTEMS:  General: negative for - chills or fever, or weight loss or gain  ENT: negative for - headaches,

## 2024-01-09 NOTE — TELEPHONE ENCOUNTER
[id number]    [id number]    Lydia Hager to [facility] via [facility]. Key: [id number]    Status is pending.
yes
Left upper chest with port palpated./yes

## 2024-01-24 ENCOUNTER — HOSPITAL ENCOUNTER (EMERGENCY)
Facility: HOSPITAL | Age: 24
Discharge: HOME | End: 2024-01-24
Payer: MEDICAID

## 2024-01-24 VITALS
WEIGHT: 162.26 LBS | HEIGHT: 62 IN | TEMPERATURE: 97.9 F | SYSTOLIC BLOOD PRESSURE: 126 MMHG | HEART RATE: 69 BPM | BODY MASS INDEX: 29.86 KG/M2 | RESPIRATION RATE: 16 BRPM | DIASTOLIC BLOOD PRESSURE: 86 MMHG | OXYGEN SATURATION: 98 %

## 2024-01-24 DIAGNOSIS — E16.2 HYPOGLYCEMIA: ICD-10-CM

## 2024-01-24 DIAGNOSIS — R20.2 FACIAL PARESTHESIA: Primary | ICD-10-CM

## 2024-01-24 LAB
ALBUMIN SERPL-MCNC: 4 G/DL (ref 3.5–5)
ALBUMIN/GLOB SERPL: 1.2 (ref 1.1–2.2)
ALP SERPL-CCNC: 45 U/L (ref 45–117)
ALT SERPL-CCNC: 43 U/L (ref 12–78)
ANION GAP SERPL CALC-SCNC: 5 MMOL/L (ref 5–15)
APPEARANCE UR: CLEAR
AST SERPL-CCNC: 12 U/L (ref 15–37)
BACTERIA URNS QL MICRO: NEGATIVE /HPF
BASOPHILS # BLD: 0.1 K/UL (ref 0–0.1)
BASOPHILS NFR BLD: 1 % (ref 0–1)
BILIRUB SERPL-MCNC: 0.2 MG/DL (ref 0.2–1)
BILIRUB UR QL: NEGATIVE
BUN SERPL-MCNC: 18 MG/DL (ref 6–20)
BUN/CREAT SERPL: 22 (ref 12–20)
CALCIUM SERPL-MCNC: 9.1 MG/DL (ref 8.5–10.1)
CHLORIDE SERPL-SCNC: 106 MMOL/L (ref 97–108)
CO2 SERPL-SCNC: 30 MMOL/L (ref 21–32)
COLOR UR: ABNORMAL
CREAT SERPL-MCNC: 0.81 MG/DL (ref 0.55–1.02)
DIFFERENTIAL METHOD BLD: ABNORMAL
EOSINOPHIL # BLD: 0.4 K/UL (ref 0–0.4)
EOSINOPHIL NFR BLD: 5 % (ref 0–7)
EPITH CASTS URNS QL MICRO: ABNORMAL /LPF
ERYTHROCYTE [DISTWIDTH] IN BLOOD BY AUTOMATED COUNT: 12.7 % (ref 11.5–14.5)
GLOBULIN SER CALC-MCNC: 3.4 G/DL (ref 2–4)
GLUCOSE BLD STRIP.AUTO-MCNC: 79 MG/DL (ref 65–117)
GLUCOSE SERPL-MCNC: 64 MG/DL (ref 65–100)
GLUCOSE UR STRIP.AUTO-MCNC: NEGATIVE MG/DL
HCG UR QL: NEGATIVE
HCT VFR BLD AUTO: 42.7 % (ref 35–47)
HGB BLD-MCNC: 14.1 G/DL (ref 11.5–16)
HGB UR QL STRIP: NEGATIVE
HYALINE CASTS URNS QL MICRO: ABNORMAL /LPF (ref 0–2)
IMM GRANULOCYTES # BLD AUTO: 0 K/UL (ref 0–0.04)
IMM GRANULOCYTES NFR BLD AUTO: 1 % (ref 0–0.5)
KETONES UR QL STRIP.AUTO: NEGATIVE MG/DL
LEUKOCYTE ESTERASE UR QL STRIP.AUTO: NEGATIVE
LYMPHOCYTES # BLD: 1.9 K/UL (ref 0.8–3.5)
LYMPHOCYTES NFR BLD: 23 % (ref 12–49)
MCH RBC QN AUTO: 31.3 PG (ref 26–34)
MCHC RBC AUTO-ENTMCNC: 33 G/DL (ref 30–36.5)
MCV RBC AUTO: 94.9 FL (ref 80–99)
MONOCYTES # BLD: 1 K/UL (ref 0–1)
MONOCYTES NFR BLD: 13 % (ref 5–13)
NEUTS SEG # BLD: 4.8 K/UL (ref 1.8–8)
NEUTS SEG NFR BLD: 57 % (ref 32–75)
NITRITE UR QL STRIP.AUTO: NEGATIVE
NRBC # BLD: 0 K/UL (ref 0–0.01)
NRBC BLD-RTO: 0 PER 100 WBC
PH UR STRIP: >8.5 [PH] (ref 5–8)
PLATELET # BLD AUTO: 312 K/UL (ref 150–400)
PMV BLD AUTO: 10 FL (ref 8.9–12.9)
POTASSIUM SERPL-SCNC: 3.9 MMOL/L (ref 3.5–5.1)
PROT SERPL-MCNC: 7.4 G/DL (ref 6.4–8.2)
PROT UR STRIP-MCNC: NEGATIVE MG/DL
RBC # BLD AUTO: 4.5 M/UL (ref 3.8–5.2)
RBC #/AREA URNS HPF: ABNORMAL /HPF (ref 0–5)
SERVICE CMNT-IMP: NORMAL
SODIUM SERPL-SCNC: 141 MMOL/L (ref 136–145)
SP GR UR REFRACTOMETRY: 1.01
TSH SERPL DL<=0.05 MIU/L-ACNC: 0.82 UIU/ML (ref 0.36–3.74)
URINE CULTURE IF INDICATED: ABNORMAL
UROBILINOGEN UR QL STRIP.AUTO: 0.2 EU/DL (ref 0.2–1)
WBC # BLD AUTO: 8.3 K/UL (ref 3.6–11)
WBC URNS QL MICRO: ABNORMAL /HPF (ref 0–4)

## 2024-01-24 PROCEDURE — 99283 EMERGENCY DEPT VISIT LOW MDM: CPT

## 2024-01-24 PROCEDURE — 80053 COMPREHEN METABOLIC PANEL: CPT

## 2024-01-24 PROCEDURE — 85025 COMPLETE CBC W/AUTO DIFF WBC: CPT

## 2024-01-24 PROCEDURE — 36415 COLL VENOUS BLD VENIPUNCTURE: CPT

## 2024-01-24 PROCEDURE — 84443 ASSAY THYROID STIM HORMONE: CPT

## 2024-01-24 PROCEDURE — 81025 URINE PREGNANCY TEST: CPT

## 2024-01-24 PROCEDURE — 81001 URINALYSIS AUTO W/SCOPE: CPT

## 2024-01-24 PROCEDURE — 82962 GLUCOSE BLOOD TEST: CPT

## 2024-01-24 ASSESSMENT — PAIN - FUNCTIONAL ASSESSMENT: PAIN_FUNCTIONAL_ASSESSMENT: NONE - DENIES PAIN

## 2024-01-24 ASSESSMENT — VISUAL ACUITY: OU: 1

## 2024-01-24 ASSESSMENT — LIFESTYLE VARIABLES
HOW OFTEN DO YOU HAVE A DRINK CONTAINING ALCOHOL: MONTHLY OR LESS
HOW MANY STANDARD DRINKS CONTAINING ALCOHOL DO YOU HAVE ON A TYPICAL DAY: 1 OR 2

## 2024-01-24 ASSESSMENT — PAIN SCALES - GENERAL: PAINLEVEL_OUTOF10: 0

## 2024-01-24 NOTE — ED PROVIDER NOTES
[facility] EMERGENCY DEPT  EMERGENCY DEPARTMENT ENCOUNTER       Pt Name: Cheyanne L Reyell  MRN: [id number]  Birthdate 2000  Date of evaluation: 1/24/2024  Provider: ALEX Toth   PCP: Mino Gutierrez MD  Note Started: 10:05 AM EST 1/24/24     CHIEF COMPLAINT       Chief Complaint   Patient presents with    Tingling     Patient reports facial tingling and tingling in her arms that has been present for 4 days.  Patient seen at urgent care and was given valium.  Patient reports pressure in her head as well.  Patient reports being sick for a few weeks and has just finished prednisone and antibiotics.         HISTORY OF PRESENT ILLNESS: 1 or more elements      History From: Patient  HPI Limitations: None     Cheyanne L Reyell is a 23 y.o. female who presents ambulatory with about 4 days of moderately severe and intermittent facial numbness and tingling.  She describes it as a \"weighted blanket on my face\".  She tells me it seems to be affecting her from her neck up however her arms seem kind of tingling and \"weak\" a little bit.  She tells me she did go to an ER last night and they tested her for flu which was negative.  She tells me she was prescribed IM and sent home.  She tells me her symptoms persist and this is causing her concerned so she presents for evaluation.    She tells me over the past months she did have a \"sinus infection\" and ultimately was treated with a Z-Wolfgang and a course of steroids.  She tells me her cough persists a little bit however she is much improved.  She has had no fever.  She denies any headache.  She denies any vision changes.    She does have a history of anxiety and migraines.  She denies any drug or alcohol use.  She tells me she does vape nicotine.     Nursing Notes were all reviewed and agreed with or any disagreements were addressed in the HPI.     REVIEW OF SYSTEMS      Review of Systems     Positives and Pertinent negatives as per HPI.    PAST HISTORY     Past Medical

## 2024-02-16 ENCOUNTER — OFFICE VISIT (OUTPATIENT)
Facility: CLINIC | Age: 24
End: 2024-02-16
Payer: MEDICAID

## 2024-02-16 VITALS
HEIGHT: 62 IN | DIASTOLIC BLOOD PRESSURE: 87 MMHG | OXYGEN SATURATION: 99 % | WEIGHT: 156.6 LBS | TEMPERATURE: 98.7 F | BODY MASS INDEX: 28.82 KG/M2 | SYSTOLIC BLOOD PRESSURE: 115 MMHG | HEART RATE: 87 BPM | RESPIRATION RATE: 18 BRPM

## 2024-02-16 DIAGNOSIS — K21.9 HIATAL HERNIA WITH GERD: Primary | ICD-10-CM

## 2024-02-16 DIAGNOSIS — K44.9 HIATAL HERNIA WITH GERD: Primary | ICD-10-CM

## 2024-02-16 DIAGNOSIS — K52.9 COLITIS: ICD-10-CM

## 2024-02-16 PROCEDURE — 99214 OFFICE O/P EST MOD 30 MIN: CPT | Performed by: INTERNAL MEDICINE

## 2024-02-16 RX ORDER — METRONIDAZOLE 500 MG/1
500 TABLET ORAL 3 TIMES DAILY
Qty: 30 TABLET | Refills: 0 | Status: SHIPPED | OUTPATIENT
Start: 2024-02-16 | End: 2024-02-26

## 2024-02-16 RX ORDER — PANTOPRAZOLE SODIUM 40 MG/1
40 TABLET, DELAYED RELEASE ORAL
Qty: 30 TABLET | Refills: 5 | Status: SHIPPED | OUTPATIENT
Start: 2024-02-16

## 2024-02-16 NOTE — PROGRESS NOTES
Chief Complaint   Patient presents with    Abdominal Pain     Patient states she has had several ED visits for stomach infection, and  Low blood sugar.        /87   Pulse 87   Temp 98.7 °F (37.1 °C)   Resp 18   Ht 1.575 m (5' 2\")   Wt 71 kg (156 lb 9.6 oz)   LMP 02/13/2024   SpO2 99%   BMI 28.64 kg/m²      1. Have you been to the ER, urgent care clinic since your last visit?  Hospitalized since your last visit? Yes 1/24/24 [facility] ED 01/2024 [facility] 2/7/24 [facility]    2. Have you seen or consulted any other health care providers outside of the [facility] since your last visit?  Include any pap smears or colon screening. No    Health Maintenance Due   Topic Date Due    Hepatitis B vaccine (1 of 3 - 3-dose series) Never done    COVID-19 Vaccine (1) Never done    Varicella vaccine (1 of 2 - 2-dose childhood series) Never done    HPV vaccine (1 - 2-dose series) Never done    HIV screen  Never done    Chlamydia/GC screen  Never done    Hepatitis C screen  Never done    Pap smear  Never done    Flu vaccine (1) Never done             No data to display               \"Have you been to the ER, urgent care clinic since your last visit?  Hospitalized since your last visit?\"    Yes 1/24/24 [facility] ED 01/2024 [facility] 2/7/24 [facility]    “Have you seen or consulted any other health care providers outside of [facility] since your last visit?”    no                            
was told except for minimal elevation of her white blood count.  If not improved with above she is to notify me.  I did suggest that she also get a probiotic and take 1 a day for the next 30 days.  Follow-up with me per previous schedule or sooner should her symptoms not resolve with above treatment.  Her ER records reviewed while she was here in the office today.  30 minutes spent in direct care of this patient today with greater than 50% in counseling and coordination of care.    PLAN:  1. Hiatal hernia with GERD  2. Colitis          ATTENTION:   This medical record was transcribed using an electronic medical records system.  Although proofread, it may and can contain electronic and spelling errors.  Other human spelling and other errors may be present.  Corrections may be executed at a later time.  Please feel free to contact us for any clarifications as needed.      No follow-up provider specified.    No results found for any visits on 02/16/24.    Mino Gutierrez MD    The patient verbalized understanding of the problems and plans as explained.

## 2024-02-19 ENCOUNTER — TELEPHONE (OUTPATIENT)
Facility: CLINIC | Age: 24
End: 2024-02-19

## 2024-02-19 NOTE — TELEPHONE ENCOUNTER
Pt states she was put on pantoprazole and had an allergic rash on Saturday, which was resolved quickly with Benadryl and has not come back. She would like an alternative medication to the pantoprazole. Please advise.

## 2024-02-22 RX ORDER — FAMOTIDINE 40 MG/1
40 TABLET, FILM COATED ORAL EVERY EVENING
Qty: 30 TABLET | Refills: 3 | Status: SHIPPED | OUTPATIENT
Start: 2024-02-22

## 2024-02-23 NOTE — TELEPHONE ENCOUNTER
Spoke with patient to let her know that Dr. Canas sent in a different medication for her. Patient verbalized understanding.

## 2024-03-25 DIAGNOSIS — F41.9 ANXIETY: ICD-10-CM

## 2024-03-25 DIAGNOSIS — F32.A DEPRESSION, UNSPECIFIED DEPRESSION TYPE: ICD-10-CM

## 2024-03-26 RX ORDER — ALPRAZOLAM 0.5 MG/1
0.5 TABLET ORAL 3 TIMES DAILY PRN
Qty: 50 TABLET | Refills: 0 | Status: SHIPPED | OUTPATIENT
Start: 2024-03-26 | End: 2024-04-25

## 2024-03-26 NOTE — TELEPHONE ENCOUNTER
PCP: Mino Gutierrez MD    Last appt: 2/16/2024  Future Appointments   Date Time Provider Department Center   4/30/2024  3:30 PM Mino Gutierrez MD [facility]       Requested Prescriptions     Pending Prescriptions Disp Refills    ALPRAZolam (XANAX) 0.5 MG tablet [Pharmacy Med Name: ALPRAZOLAM 0.5 MG TABLET] 50 tablet 0     Sig: Take 1 tablet by mouth 3 times daily as needed for Anxiety for up to 30 days. Max Daily Amount: 1.5 mg       Prior labs and Blood pressures:  BP Readings from Last 3 Encounters:   02/16/24 115/87   01/24/24 126/86   12/01/23 126/86     Lab Results   Component Value Date/Time     01/24/2024 10:57 AM    K 3.9 01/24/2024 10:57 AM     01/24/2024 10:57 AM    CO2 30 01/24/2024 10:57 AM    BUN 18 01/24/2024 10:57 AM    GFRAA 129 12/08/2021 09:30 AM     No results found for: \"HBA1C\", \"[id number]\"  Lab Results   Component Value Date/Time    CHOL 132 04/28/2023 12:00 AM    HDL 59 04/28/2023 12:00 AM    VLDL 13 04/28/2023 12:00 AM     No results found for: \"VITD3\", \"VD3RIA\"        Lab Results   Component Value Date/Time    TSH 0.617 04/28/2023 12:00 AM

## 2024-04-30 ENCOUNTER — OFFICE VISIT (OUTPATIENT)
Facility: CLINIC | Age: 24
End: 2024-04-30
Payer: MEDICAID

## 2024-04-30 VITALS
WEIGHT: 164.5 LBS | DIASTOLIC BLOOD PRESSURE: 80 MMHG | OXYGEN SATURATION: 98 % | HEART RATE: 92 BPM | BODY MASS INDEX: 30.27 KG/M2 | TEMPERATURE: 98 F | HEIGHT: 62 IN | RESPIRATION RATE: 18 BRPM | SYSTOLIC BLOOD PRESSURE: 110 MMHG

## 2024-04-30 DIAGNOSIS — Z86.39 HISTORY OF HYPERTHYROIDISM: ICD-10-CM

## 2024-04-30 DIAGNOSIS — R00.2 PALPITATIONS: ICD-10-CM

## 2024-04-30 DIAGNOSIS — G43.709 CHRONIC MIGRAINE WITHOUT AURA, NOT INTRACTABLE, WITHOUT STATUS MIGRAINOSUS: ICD-10-CM

## 2024-04-30 DIAGNOSIS — K44.9 HIATAL HERNIA WITH GERD: ICD-10-CM

## 2024-04-30 DIAGNOSIS — F41.9 ANXIETY: ICD-10-CM

## 2024-04-30 DIAGNOSIS — Z00.00 ANNUAL PHYSICAL EXAM: Primary | ICD-10-CM

## 2024-04-30 DIAGNOSIS — K21.9 HIATAL HERNIA WITH GERD: ICD-10-CM

## 2024-04-30 PROCEDURE — 99395 PREV VISIT EST AGE 18-39: CPT | Performed by: INTERNAL MEDICINE

## 2024-04-30 ASSESSMENT — PATIENT HEALTH QUESTIONNAIRE - PHQ9
10. IF YOU CHECKED OFF ANY PROBLEMS, HOW DIFFICULT HAVE THESE PROBLEMS MADE IT FOR YOU TO DO YOUR WORK, TAKE CARE OF THINGS AT HOME, OR GET ALONG WITH OTHER PEOPLE: NOT DIFFICULT AT ALL
6. FEELING BAD ABOUT YOURSELF - OR THAT YOU ARE A FAILURE OR HAVE LET YOURSELF OR YOUR FAMILY DOWN: NOT AT ALL
8. MOVING OR SPEAKING SO SLOWLY THAT OTHER PEOPLE COULD HAVE NOTICED. OR THE OPPOSITE, BEING SO FIGETY OR RESTLESS THAT YOU HAVE BEEN MOVING AROUND A LOT MORE THAN USUAL: NOT AT ALL
4. FEELING TIRED OR HAVING LITTLE ENERGY: NOT AT ALL
7. TROUBLE CONCENTRATING ON THINGS, SUCH AS READING THE NEWSPAPER OR WATCHING TELEVISION: NOT AT ALL
SUM OF ALL RESPONSES TO PHQ QUESTIONS 1-9: 0
5. POOR APPETITE OR OVEREATING: NOT AT ALL
SUM OF ALL RESPONSES TO PHQ QUESTIONS 1-9: 0
9. THOUGHTS THAT YOU WOULD BE BETTER OFF DEAD, OR OF HURTING YOURSELF: NOT AT ALL
2. FEELING DOWN, DEPRESSED OR HOPELESS: NOT AT ALL
3. TROUBLE FALLING OR STAYING ASLEEP: NOT AT ALL
1. LITTLE INTEREST OR PLEASURE IN DOING THINGS: NOT AT ALL
SUM OF ALL RESPONSES TO PHQ QUESTIONS 1-9: 0
SUM OF ALL RESPONSES TO PHQ9 QUESTIONS 1 & 2: 0
SUM OF ALL RESPONSES TO PHQ QUESTIONS 1-9: 0

## 2024-04-30 NOTE — PROGRESS NOTES
Annual Exam       HPI:     Cheyanne L Reyell is a 23 y.o. year old female who is here for her annual comprehensive healthcare exam and she is also in follow-up of her medical problems to include hiatal hernia with GERD, history of palpitations, chronic migraine as well as tension vascular headache, history of hyperthyroidism, anxiety with depression and other medical problems.  She is taking her medication and trying to exercise on a regular basis but predominantly she is doing cardio 1 day a week and the other 3 to 4 days a week she is doing weight lifting and she has been told by a bunch of her friends who work out that bodybuilding to get muscle helps her burn off calories.  Unfortunate she has been working out for the last 6 weeks she is actually gaining weight rather than lost weight.  She notes no chest pain, shortness of breath, palpitations, PND, orthopnea or other cardiac or respiratory complaints.  She notes no current GI or  complaints.  She notes no headaches, dizziness or neurologic complaints.  She notes no current active arthritic complaints and there are no other complaints on complete review of systems.             /80 (Site: Left Upper Arm, Position: Sitting, Cuff Size: Large Adult)   Pulse 92   Temp 98 °F (36.7 °C) (Oral)   Resp 18   Ht 1.575 m (5' 2\")   Wt 74.6 kg (164 lb 8 oz)   SpO2 98%   BMI 30.09 kg/m²     Past Medical History:   Diagnosis Date    Anxiety     Depression     mild/ Anxiety    Migraines 2014    Palpitations        History reviewed. No pertinent surgical history.    Prior to Admission medications    Medication Sig Start Date End Date Taking? Authorizing Provider   famotidine (PEPCID) 40 MG tablet Take 1 tablet by mouth every evening 2/22/24  Yes SUSANNA Canas MD   venlafaxine (EFFEXOR XR) 150 MG extended release capsule Take 1 capsule by mouth daily 12/1/23  Yes Mino Gutierrez MD   metoprolol tartrate (LOPRESSOR) 25 MG tablet TAKE 1 TABLET BY MOUTH EVERY

## 2024-04-30 NOTE — PROGRESS NOTES
Cheyanne L Reyell is a 23 y.o. female     Chief Complaint   Patient presents with    Annual Exam       /80 (Site: Left Upper Arm, Position: Sitting, Cuff Size: Large Adult)   Pulse (!) 109   Temp 98 °F (36.7 °C) (Oral)   Resp 18   Ht 1.575 m (5' 2\")   Wt 74.6 kg (164 lb 8 oz)   SpO2 98%   BMI 30.09 kg/m²     Health Maintenance Due   Topic Date Due    Hepatitis B vaccine (1 of 3 - 3-dose series) Never done    COVID-19 Vaccine (1) Never done    Varicella vaccine (1 of 2 - 2-dose childhood series) Never done    HPV vaccine (1 - 2-dose series) Never done    HIV screen  Never done    Chlamydia/GC screen  Never done    Hepatitis C screen  Never done    Pap smear  Never done         \"Have you been to the ER, urgent care clinic since your last visit?  Hospitalized since your last visit?\"    NO    “Have you seen or consulted any other health care providers outside of [facility] since your last visit?”    NO

## 2024-05-01 LAB
APPEARANCE UR: CLEAR
BACTERIA #/AREA URNS HPF: ABNORMAL /[HPF]
BASOPHILS # BLD AUTO: 0.1 X10E3/UL (ref 0–0.2)
BASOPHILS NFR BLD AUTO: 1 %
BILIRUB UR QL STRIP: NEGATIVE
CASTS URNS QL MICRO: ABNORMAL /LPF
COLOR UR: YELLOW
EOSINOPHIL # BLD AUTO: 0.3 X10E3/UL (ref 0–0.4)
EOSINOPHIL NFR BLD AUTO: 4 %
EPI CELLS #/AREA URNS HPF: ABNORMAL /HPF (ref 0–10)
ERYTHROCYTE [DISTWIDTH] IN BLOOD BY AUTOMATED COUNT: 12.2 % (ref 11.7–15.4)
GLUCOSE UR QL STRIP: NEGATIVE
HCT VFR BLD AUTO: 39.4 % (ref 34–46.6)
HGB BLD-MCNC: 13.7 G/DL (ref 11.1–15.9)
HGB UR QL STRIP: NEGATIVE
IMM GRANULOCYTES # BLD AUTO: 0 X10E3/UL (ref 0–0.1)
IMM GRANULOCYTES NFR BLD AUTO: 0 %
KETONES UR QL STRIP: NEGATIVE
LEUKOCYTE ESTERASE UR QL STRIP: NEGATIVE
LYMPHOCYTES # BLD AUTO: 2.7 X10E3/UL (ref 0.7–3.1)
LYMPHOCYTES NFR BLD AUTO: 41 %
MCH RBC QN AUTO: 32 PG (ref 26.6–33)
MCHC RBC AUTO-ENTMCNC: 34.8 G/DL (ref 31.5–35.7)
MCV RBC AUTO: 92 FL (ref 79–97)
MICRO URNS: NORMAL
MICRO URNS: NORMAL
MONOCYTES # BLD AUTO: 0.6 X10E3/UL (ref 0.1–0.9)
MONOCYTES NFR BLD AUTO: 9 %
NEUTROPHILS # BLD AUTO: 3 X10E3/UL (ref 1.4–7)
NEUTROPHILS NFR BLD AUTO: 45 %
NITRITE UR QL STRIP: NEGATIVE
PH UR STRIP: 7.5 [PH] (ref 5–7.5)
PLATELET # BLD AUTO: 289 X10E3/UL (ref 150–450)
PROT UR QL STRIP: NEGATIVE
RBC # BLD AUTO: 4.28 X10E6/UL (ref 3.77–5.28)
RBC #/AREA URNS HPF: ABNORMAL /HPF (ref 0–2)
SP GR UR STRIP: 1.02 (ref 1–1.03)
UROBILINOGEN UR STRIP-MCNC: 1 MG/DL (ref 0.2–1)
WBC # BLD AUTO: 6.6 X10E3/UL (ref 3.4–10.8)
WBC #/AREA URNS HPF: ABNORMAL /HPF (ref 0–5)

## 2024-05-02 LAB
ALBUMIN SERPL-MCNC: 4.5 G/DL (ref 4–5)
ALBUMIN/GLOB SERPL: 1.5 {RATIO} (ref 1.2–2.2)
ALP SERPL-CCNC: 38 IU/L (ref 44–121)
ALT SERPL-CCNC: 31 IU/L (ref 0–32)
AST SERPL-CCNC: 28 IU/L (ref 0–40)
BILIRUB SERPL-MCNC: <0.2 MG/DL (ref 0–1.2)
BUN SERPL-MCNC: 15 MG/DL (ref 6–20)
BUN/CREAT SERPL: 18 (ref 9–23)
CALCIUM SERPL-MCNC: 9.8 MG/DL (ref 8.7–10.2)
CHLORIDE SERPL-SCNC: 100 MMOL/L (ref 96–106)
CHOLEST SERPL-MCNC: 157 MG/DL (ref 100–199)
CO2 SERPL-SCNC: 21 MMOL/L (ref 20–29)
CREAT SERPL-MCNC: 0.82 MG/DL (ref 0.57–1)
EGFRCR SERPLBLD CKD-EPI 2021: 102 ML/MIN/1.73
GLOBULIN SER CALC-MCNC: 3.1 G/DL (ref 1.5–4.5)
GLUCOSE SERPL-MCNC: 65 MG/DL (ref 70–99)
HDLC SERPL-MCNC: 47 MG/DL
LDLC SERPL CALC-MCNC: 57 MG/DL (ref 0–99)
POTASSIUM SERPL-SCNC: 5 MMOL/L (ref 3.5–5.2)
PROT SERPL-MCNC: 7.6 G/DL (ref 6–8.5)
SODIUM SERPL-SCNC: 140 MMOL/L (ref 134–144)
T4 FREE SERPL-MCNC: 0.97 NG/DL (ref 0.82–1.77)
TRIGL SERPL-MCNC: 345 MG/DL (ref 0–149)
TSH SERPL DL<=0.005 MIU/L-ACNC: 0.55 UIU/ML (ref 0.45–4.5)
VLDLC SERPL CALC-MCNC: 53 MG/DL (ref 5–40)

## 2024-05-29 PROBLEM — Z00.00 ANNUAL PHYSICAL EXAM: Status: RESOLVED | Noted: 2020-01-28 | Resolved: 2024-05-29

## 2024-06-18 DIAGNOSIS — F41.9 ANXIETY: ICD-10-CM

## 2024-06-18 DIAGNOSIS — F32.A DEPRESSION, UNSPECIFIED DEPRESSION TYPE: ICD-10-CM

## 2024-06-18 RX ORDER — ALPRAZOLAM 0.5 MG/1
0.5 TABLET ORAL 3 TIMES DAILY PRN
Qty: 50 TABLET | Refills: 0 | Status: SHIPPED | OUTPATIENT
Start: 2024-06-18 | End: 2024-07-18

## 2024-06-18 NOTE — TELEPHONE ENCOUNTER
PCP: Mino Gutierrez MD    Last appt: 4/30/2024  Future Appointments   Date Time Provider Department Center   11/6/2024 10:00 AM Mino Gutierrez MD [facility]       Requested Prescriptions     Pending Prescriptions Disp Refills    ALPRAZolam (XANAX) 0.5 MG tablet [Pharmacy Med Name: ALPRAZOLAM 0.5 MG TABLET] 50 tablet 0     Sig: Take 1 tablet by mouth 3 times daily as needed for Anxiety for up to 30 days. Max Daily Amount: 1.5 mg       Prior labs and Blood pressures:  BP Readings from Last 3 Encounters:   04/30/24 110/80   02/16/24 115/87   01/24/24 126/86     Lab Results   Component Value Date/Time     04/30/2024 12:00 AM    K 5.0 04/30/2024 12:00 AM     04/30/2024 12:00 AM    CO2 21 04/30/2024 12:00 AM    BUN 15 04/30/2024 12:00 AM    GFRAA 129 12/08/2021 09:30 AM     No results found for: \"HBA1C\", \"[id number]\"  Lab Results   Component Value Date/Time    CHOL 157 04/30/2024 12:00 AM    HDL 47 04/30/2024 12:00 AM    LDL 57 04/30/2024 12:00 AM    LDL 60 04/28/2023 12:00 AM    VLDL 53 04/30/2024 12:00 AM    VLDL 13 04/28/2023 12:00 AM     No results found for: \"VITD3\"        Lab Results   Component Value Date/Time    TSH 0.545 04/30/2024 12:00 AM

## 2024-06-30 ENCOUNTER — APPOINTMENT (OUTPATIENT)
Facility: HOSPITAL | Age: 24
End: 2024-06-30
Payer: COMMERCIAL

## 2024-06-30 ENCOUNTER — HOSPITAL ENCOUNTER (EMERGENCY)
Facility: HOSPITAL | Age: 24
Discharge: HOME | End: 2024-06-30
Attending: EMERGENCY MEDICINE
Payer: COMMERCIAL

## 2024-06-30 VITALS
HEIGHT: 62 IN | TEMPERATURE: 98.1 F | SYSTOLIC BLOOD PRESSURE: 132 MMHG | DIASTOLIC BLOOD PRESSURE: 70 MMHG | OXYGEN SATURATION: 98 % | RESPIRATION RATE: 14 BRPM | HEART RATE: 70 BPM | WEIGHT: 163.58 LBS | BODY MASS INDEX: 30.1 KG/M2

## 2024-06-30 DIAGNOSIS — R07.89 ATYPICAL CHEST PAIN: Primary | ICD-10-CM

## 2024-06-30 LAB
ALBUMIN SERPL-MCNC: 3.7 G/DL (ref 3.5–5)
ALBUMIN/GLOB SERPL: 1.1 (ref 1.1–2.2)
ALP SERPL-CCNC: 40 U/L (ref 45–117)
ALT SERPL-CCNC: 23 U/L (ref 12–78)
ANION GAP SERPL CALC-SCNC: 5 MMOL/L (ref 5–15)
AST SERPL-CCNC: 17 U/L (ref 15–37)
BASOPHILS # BLD: 0.1 K/UL (ref 0–0.1)
BASOPHILS NFR BLD: 1 % (ref 0–1)
BILIRUB SERPL-MCNC: 0.6 MG/DL (ref 0.2–1)
BUN SERPL-MCNC: 17 MG/DL (ref 6–20)
BUN/CREAT SERPL: 23 (ref 12–20)
CALCIUM SERPL-MCNC: 9.1 MG/DL (ref 8.5–10.1)
CHLORIDE SERPL-SCNC: 108 MMOL/L (ref 97–108)
CO2 SERPL-SCNC: 24 MMOL/L (ref 21–32)
CREAT SERPL-MCNC: 0.74 MG/DL (ref 0.55–1.02)
D DIMER PPP FEU-MCNC: <0.19 MG/L FEU (ref 0–0.65)
DIFFERENTIAL METHOD BLD: ABNORMAL
EKG ATRIAL RATE: 71 BPM
EKG DIAGNOSIS: NORMAL
EKG P AXIS: 41 DEGREES
EKG P-R INTERVAL: 128 MS
EKG Q-T INTERVAL: 382 MS
EKG QRS DURATION: 72 MS
EKG QTC CALCULATION (BAZETT): 415 MS
EKG R AXIS: 46 DEGREES
EKG T AXIS: 30 DEGREES
EKG VENTRICULAR RATE: 71 BPM
EOSINOPHIL # BLD: 0.4 K/UL (ref 0–0.4)
EOSINOPHIL NFR BLD: 4 % (ref 0–7)
ERYTHROCYTE [DISTWIDTH] IN BLOOD BY AUTOMATED COUNT: 13.1 % (ref 11.5–14.5)
GLOBULIN SER CALC-MCNC: 3.5 G/DL (ref 2–4)
GLUCOSE SERPL-MCNC: 100 MG/DL (ref 65–100)
HCT VFR BLD AUTO: 36 % (ref 35–47)
HGB BLD-MCNC: 12.2 G/DL (ref 11.5–16)
IMM GRANULOCYTES # BLD AUTO: 0 K/UL (ref 0–0.04)
IMM GRANULOCYTES NFR BLD AUTO: 0 % (ref 0–0.5)
LIPASE SERPL-CCNC: 27 U/L (ref 13–75)
LYMPHOCYTES # BLD: 3.9 K/UL (ref 0.8–3.5)
LYMPHOCYTES NFR BLD: 43 % (ref 12–49)
MCH RBC QN AUTO: 31 PG (ref 26–34)
MCHC RBC AUTO-ENTMCNC: 33.9 G/DL (ref 30–36.5)
MCV RBC AUTO: 91.6 FL (ref 80–99)
MONOCYTES # BLD: 0.7 K/UL (ref 0–1)
MONOCYTES NFR BLD: 8 % (ref 5–13)
NEUTS SEG # BLD: 3.9 K/UL (ref 1.8–8)
NEUTS SEG NFR BLD: 44 % (ref 32–75)
NRBC # BLD: 0 K/UL (ref 0–0.01)
NRBC BLD-RTO: 0 PER 100 WBC
NT PRO BNP: 51 PG/ML
PLATELET # BLD AUTO: 280 K/UL (ref 150–400)
PMV BLD AUTO: 10.6 FL (ref 8.9–12.9)
POTASSIUM SERPL-SCNC: 4.3 MMOL/L (ref 3.5–5.1)
PROT SERPL-MCNC: 7.2 G/DL (ref 6.4–8.2)
RBC # BLD AUTO: 3.93 M/UL (ref 3.8–5.2)
SODIUM SERPL-SCNC: 137 MMOL/L (ref 136–145)
TROPONIN I SERPL HS-MCNC: <4 NG/L (ref 0–51)
WBC # BLD AUTO: 9.1 K/UL (ref 3.6–11)

## 2024-06-30 PROCEDURE — 96374 THER/PROPH/DIAG INJ IV PUSH: CPT

## 2024-06-30 PROCEDURE — 83880 ASSAY OF NATRIURETIC PEPTIDE: CPT

## 2024-06-30 PROCEDURE — 93005 ELECTROCARDIOGRAM TRACING: CPT | Performed by: EMERGENCY MEDICINE

## 2024-06-30 PROCEDURE — 2500000003 HC RX 250 WO HCPCS: Performed by: EMERGENCY MEDICINE

## 2024-06-30 PROCEDURE — 6360000002 HC RX W HCPCS: Performed by: EMERGENCY MEDICINE

## 2024-06-30 PROCEDURE — 99285 EMERGENCY DEPT VISIT HI MDM: CPT

## 2024-06-30 PROCEDURE — 71046 X-RAY EXAM CHEST 2 VIEWS: CPT

## 2024-06-30 PROCEDURE — 80053 COMPREHEN METABOLIC PANEL: CPT

## 2024-06-30 PROCEDURE — 96375 TX/PRO/DX INJ NEW DRUG ADDON: CPT

## 2024-06-30 PROCEDURE — 6370000000 HC RX 637 (ALT 250 FOR IP): Performed by: EMERGENCY MEDICINE

## 2024-06-30 PROCEDURE — 84484 ASSAY OF TROPONIN QUANT: CPT

## 2024-06-30 PROCEDURE — 85379 FIBRIN DEGRADATION QUANT: CPT

## 2024-06-30 PROCEDURE — 36415 COLL VENOUS BLD VENIPUNCTURE: CPT

## 2024-06-30 PROCEDURE — 2580000003 HC RX 258: Performed by: EMERGENCY MEDICINE

## 2024-06-30 PROCEDURE — 85025 COMPLETE CBC W/AUTO DIFF WBC: CPT

## 2024-06-30 PROCEDURE — 83690 ASSAY OF LIPASE: CPT

## 2024-06-30 RX ORDER — SUCRALFATE ORAL 1 G/10ML
1 SUSPENSION ORAL 3 TIMES DAILY
Qty: 1200 ML | Refills: 3 | Status: SHIPPED | OUTPATIENT
Start: 2024-06-30

## 2024-06-30 RX ORDER — ONDANSETRON 4 MG/1
4 TABLET, FILM COATED ORAL EVERY 8 HOURS PRN
Qty: 12 TABLET | Refills: 0 | Status: SHIPPED | OUTPATIENT
Start: 2024-06-30

## 2024-06-30 RX ORDER — KETOROLAC TROMETHAMINE 30 MG/ML
15 INJECTION, SOLUTION INTRAMUSCULAR; INTRAVENOUS
Status: COMPLETED | OUTPATIENT
Start: 2024-06-30 | End: 2024-06-30

## 2024-06-30 RX ADMIN — FAMOTIDINE 20 MG: 10 INJECTION, SOLUTION INTRAVENOUS at 04:57

## 2024-06-30 RX ADMIN — ALUMINUM HYDROXIDE, MAGNESIUM HYDROXIDE, AND SIMETHICONE 40 ML: 1200; 120; 1200 SUSPENSION ORAL at 04:57

## 2024-06-30 RX ADMIN — KETOROLAC TROMETHAMINE 15 MG: 30 INJECTION, SOLUTION INTRAMUSCULAR at 04:57

## 2024-06-30 ASSESSMENT — PAIN SCALES - GENERAL
PAINLEVEL_OUTOF10: 4
PAINLEVEL_OUTOF10: 2

## 2024-06-30 ASSESSMENT — PAIN DESCRIPTION - ONSET: ONSET: AWAKENED FROM SLEEP

## 2024-06-30 ASSESSMENT — PAIN - FUNCTIONAL ASSESSMENT: PAIN_FUNCTIONAL_ASSESSMENT: 0-10

## 2024-06-30 ASSESSMENT — PAIN DESCRIPTION - PAIN TYPE: TYPE: ACUTE PAIN

## 2024-06-30 ASSESSMENT — PAIN DESCRIPTION - LOCATION
LOCATION: CHEST
LOCATION: CHEST

## 2024-06-30 ASSESSMENT — LIFESTYLE VARIABLES
HOW MANY STANDARD DRINKS CONTAINING ALCOHOL DO YOU HAVE ON A TYPICAL DAY: 1 OR 2
HOW OFTEN DO YOU HAVE A DRINK CONTAINING ALCOHOL: 2-4 TIMES A MONTH

## 2024-06-30 ASSESSMENT — PAIN DESCRIPTION - ORIENTATION: ORIENTATION: MID

## 2024-06-30 ASSESSMENT — PAIN DESCRIPTION - DESCRIPTORS: DESCRIPTORS: ACHING

## 2024-06-30 NOTE — DISCHARGE INSTRUCTIONS
It was a pleasure taking care of you in our Emergency Department today.  We know that when you come to [facility], you are entrusting us with your health, comfort, and safety.  Our physicians and nurses honor that trust, and truly appreciate the opportunity to care for you and your loved ones.      We also value your feedback.  If you receive a survey about your Emergency Department experience today, please fill it out.  We care about our patients' feedback, and we listen to what you have to say.  Thank you!      Dr. Lakeisha Felton MD.

## 2024-06-30 NOTE — ED PROVIDER NOTES
Please excuse any errors that have escaped final proofreading.)           Lakeisha Felton MD  06/30/24 0612

## 2024-07-11 RX ORDER — VENLAFAXINE HYDROCHLORIDE 150 MG/1
CAPSULE, EXTENDED RELEASE ORAL DAILY
Qty: 90 CAPSULE | Refills: 1 | Status: SHIPPED | OUTPATIENT
Start: 2024-07-11

## 2024-07-11 NOTE — TELEPHONE ENCOUNTER
PCP: Mino Gutierrez MD    Last appt: 4/30/2024  Future Appointments   Date Time Provider Department Center   11/6/2024 10:00 AM Mino Gutierrez MD [facility]       Requested Prescriptions     Pending Prescriptions Disp Refills    venlafaxine (EFFEXOR XR) 150 MG extended release capsule [Pharmacy Med Name: VENLAFAXINE HCL  MG CAP] 90 capsule 1     Sig: TAKE 1 CAPSULE BY MOUTH EVERY DAY       Prior labs and Blood pressures:  BP Readings from Last 3 Encounters:   06/30/24 132/70   04/30/24 110/80   02/16/24 115/87     Lab Results   Component Value Date/Time     06/30/2024 03:54 AM    K 4.3 06/30/2024 03:54 AM     06/30/2024 03:54 AM    CO2 24 06/30/2024 03:54 AM    BUN 17 06/30/2024 03:54 AM    GFRAA 129 12/08/2021 09:30 AM     No results found for: \"HBA1C\", \"[id number]\"  Lab Results   Component Value Date/Time    CHOL 157 04/30/2024 12:00 AM    HDL 47 04/30/2024 12:00 AM    LDL 57 04/30/2024 12:00 AM    LDL 60 04/28/2023 12:00 AM    VLDL 53 04/30/2024 12:00 AM    VLDL 13 04/28/2023 12:00 AM     No results found for: \"VITD3\"        Lab Results   Component Value Date/Time    TSH 0.545 04/30/2024 12:00 AM    TSH 0.82 01/24/2024 10:57 AM

## 2024-08-14 DIAGNOSIS — F41.9 ANXIETY: ICD-10-CM

## 2024-08-14 DIAGNOSIS — F32.A DEPRESSION, UNSPECIFIED DEPRESSION TYPE: ICD-10-CM

## 2024-08-14 NOTE — TELEPHONE ENCOUNTER
RX refill request from the patient/pharmacy. Patient last seen 04- with labs, and next appt. scheduled for 11-  Requested Prescriptions     Pending Prescriptions Disp Refills    ALPRAZolam (XANAX) 0.5 MG tablet [Pharmacy Med Name: ALPRAZOLAM 0.5 MG TABLET] 50 tablet 0     Sig: Take 1 tablet by mouth 3 times daily as needed for Anxiety for up to 30 days. for anxiety for up to 30 days Max Daily Amount: 1.5 mg    .

## 2024-08-15 RX ORDER — ALPRAZOLAM 0.5 MG/1
0.5 TABLET ORAL 3 TIMES DAILY PRN
Qty: 50 TABLET | Refills: 0 | Status: SHIPPED | OUTPATIENT
Start: 2024-08-15 | End: 2024-09-14

## 2024-10-30 DIAGNOSIS — F32.A DEPRESSION, UNSPECIFIED DEPRESSION TYPE: ICD-10-CM

## 2024-10-30 DIAGNOSIS — F41.9 ANXIETY: ICD-10-CM

## 2024-10-30 RX ORDER — ALPRAZOLAM 0.5 MG
0.5 TABLET ORAL 3 TIMES DAILY PRN
Qty: 50 TABLET | Refills: 0 | Status: SHIPPED | OUTPATIENT
Start: 2024-10-30 | End: 2024-11-29

## 2024-10-30 NOTE — TELEPHONE ENCOUNTER
PCP: Mino Gutierrez MD    Last appt: Visit date not found  Future Appointments   Date Time Provider Department Center   11/15/2024  3:40 PM Mino Gutierrez MD [facility]       Requested Prescriptions     Pending Prescriptions Disp Refills    ALPRAZolam (XANAX) 0.5 MG tablet [Pharmacy Med Name: ALPRAZOLAM 0.5 MG TABLET] 50 tablet 0     Sig: Take 1 tablet by mouth 3 times daily as needed for Anxiety. for anxiety for up to 30 days       Prior labs and Blood pressures:  BP Readings from Last 3 Encounters:   06/30/24 132/70   04/30/24 110/80   02/16/24 115/87     Lab Results   Component Value Date/Time     06/30/2024 03:54 AM    K 4.3 06/30/2024 03:54 AM     06/30/2024 03:54 AM    CO2 24 06/30/2024 03:54 AM    BUN 17 06/30/2024 03:54 AM    GFRAA 129 12/08/2021 09:30 AM     No results found for: \"HBA1C\", \"KAG4EOBX\"  Lab Results   Component Value Date/Time    CHOL 157 04/30/2024 12:00 AM    HDL 47 04/30/2024 12:00 AM    LDL 57 04/30/2024 12:00 AM    LDL 60 04/28/2023 12:00 AM    VLDL 53 04/30/2024 12:00 AM    VLDL 13 04/28/2023 12:00 AM     No results found for: \"VITD3\"        Lab Results   Component Value Date/Time    TSH 0.545 04/30/2024 12:00 AM    TSH 0.82 01/24/2024 10:57 AM

## 2024-11-14 PROBLEM — E78.1 HYPERTRIGLYCERIDEMIA WITHOUT HYPERCHOLESTEROLEMIA: Status: ACTIVE | Noted: 2024-11-14

## 2024-11-15 ENCOUNTER — OFFICE VISIT (OUTPATIENT)
Facility: CLINIC | Age: 24
End: 2024-11-15

## 2024-11-15 VITALS
DIASTOLIC BLOOD PRESSURE: 86 MMHG | OXYGEN SATURATION: 98 % | RESPIRATION RATE: 18 BRPM | HEART RATE: 80 BPM | WEIGHT: 171.7 LBS | HEIGHT: 62 IN | TEMPERATURE: 98.6 F | SYSTOLIC BLOOD PRESSURE: 136 MMHG | BODY MASS INDEX: 31.6 KG/M2

## 2024-11-15 DIAGNOSIS — E66.811 OBESITY (BMI 30.0-34.9): ICD-10-CM

## 2024-11-15 DIAGNOSIS — F32.A DEPRESSION, UNSPECIFIED DEPRESSION TYPE: ICD-10-CM

## 2024-11-15 DIAGNOSIS — K21.9 HIATAL HERNIA WITH GERD: Primary | ICD-10-CM

## 2024-11-15 DIAGNOSIS — K44.9 HIATAL HERNIA WITH GERD: Primary | ICD-10-CM

## 2024-11-15 DIAGNOSIS — E78.1 HYPERTRIGLYCERIDEMIA WITHOUT HYPERCHOLESTEROLEMIA: ICD-10-CM

## 2024-11-15 DIAGNOSIS — G43.709 CHRONIC MIGRAINE WITHOUT AURA, NOT INTRACTABLE, WITHOUT STATUS MIGRAINOSUS: ICD-10-CM

## 2024-11-15 DIAGNOSIS — F41.9 ANXIETY: ICD-10-CM

## 2024-11-15 DIAGNOSIS — R00.2 PALPITATIONS: ICD-10-CM

## 2024-11-15 RX ORDER — PHENTERMINE HYDROCHLORIDE 15 MG/1
15 CAPSULE ORAL EVERY MORNING
Qty: 30 CAPSULE | Refills: 0 | Status: SHIPPED | OUTPATIENT
Start: 2024-11-15 | End: 2024-12-15

## 2024-11-15 NOTE — PROGRESS NOTES
Cheyanne L Reyell is a 24 y.o. female     Chief Complaint   Patient presents with    Anxiety     6 month f/u    Hypothyroidism    Depression       /87 (Site: Left Upper Arm, Position: Sitting, Cuff Size: Large Adult)   Pulse (!) 105   Temp 98.6 °F (37 °C) (Oral)   Resp 18   Ht 1.575 m (5' 2\")   Wt 77.9 kg (171 lb 11.2 oz)   SpO2 98%   BMI 31.40 kg/m²     Health Maintenance Due   Topic Date Due    Varicella vaccine (1 of 2 - 13+ 2-dose series) Never done    HPV vaccine (1 - 3-dose series) Never done    HIV screen  Never done    Hepatitis C screen  Never done    Hepatitis B vaccine (1 of 3 - 19+ 3-dose series) Never done    Pap smear  Never done    Flu vaccine (1) Never done    COVID-19 Vaccine (1 - 2023-24 season) Never done         \"Have you been to the ER, urgent care clinic since your last visit?  Hospitalized since your last visit?\"    NO    “Have you seen or consulted any other health care providers outside of [facility] since your last visit?”    NO        “Have you had a pap smear?”    NO    No cervical cancer screening on file                  
R-0Normal          ATTENTION:   This medical record was transcribed using an electronic medical records system.  Although proofread, it may and can contain electronic and spelling errors.  Other human spelling and other errors may be present.  Corrections may be executed at a later time.  Please feel free to contact us for any clarifications as needed.      No follow-up provider specified.    No results found for any visits on 11/15/24.    Mino Gutierrez MD    The patient verbalized understanding of the problems and plans as explained.

## 2024-11-16 LAB
ALBUMIN SERPL-MCNC: 4.4 G/DL (ref 3.5–5)
ALBUMIN/GLOB SERPL: 1.1 (ref 1.1–2.2)
ALP SERPL-CCNC: 36 U/L (ref 45–117)
ALT SERPL-CCNC: 22 U/L (ref 12–78)
ANION GAP SERPL CALC-SCNC: 4 MMOL/L (ref 2–12)
AST SERPL-CCNC: 15 U/L (ref 15–37)
BILIRUB SERPL-MCNC: 0.5 MG/DL (ref 0.2–1)
BUN SERPL-MCNC: 14 MG/DL (ref 6–20)
BUN/CREAT SERPL: 19 (ref 12–20)
CALCIUM SERPL-MCNC: 10 MG/DL (ref 8.5–10.1)
CHLORIDE SERPL-SCNC: 105 MMOL/L (ref 97–108)
CHOLEST SERPL-MCNC: 175 MG/DL
CO2 SERPL-SCNC: 27 MMOL/L (ref 21–32)
CREAT SERPL-MCNC: 0.75 MG/DL (ref 0.55–1.02)
GLOBULIN SER CALC-MCNC: 4 G/DL (ref 2–4)
GLUCOSE SERPL-MCNC: 91 MG/DL (ref 65–100)
HDLC SERPL-MCNC: 70 MG/DL
HDLC SERPL: 2.5 (ref 0–5)
LDLC SERPL CALC-MCNC: 94.8 MG/DL (ref 0–100)
POTASSIUM SERPL-SCNC: 4.1 MMOL/L (ref 3.5–5.1)
PROT SERPL-MCNC: 8.4 G/DL (ref 6.4–8.2)
SODIUM SERPL-SCNC: 136 MMOL/L (ref 136–145)
TRIGL SERPL-MCNC: 51 MG/DL
VLDLC SERPL CALC-MCNC: 10.2 MG/DL

## 2024-12-06 DIAGNOSIS — F32.A DEPRESSION, UNSPECIFIED DEPRESSION TYPE: ICD-10-CM

## 2024-12-06 DIAGNOSIS — F41.9 ANXIETY: ICD-10-CM

## 2024-12-09 RX ORDER — ALPRAZOLAM 0.5 MG
0.5 TABLET ORAL 3 TIMES DAILY PRN
Qty: 50 TABLET | Refills: 0 | Status: SHIPPED | OUTPATIENT
Start: 2024-12-09 | End: 2025-01-08

## 2024-12-09 NOTE — TELEPHONE ENCOUNTER
RX refill request from the patient/pharmacy. Patient last seen 11- with labs, and next appt. scheduled for 12-  Requested Prescriptions     Pending Prescriptions Disp Refills    ALPRAZolam (XANAX) 0.5 MG tablet [Pharmacy Med Name: ALPRAZOLAM 0.5 MG TABLET] 50 tablet 0     Sig: Take 1 tablet by mouth 3 times daily as needed for Anxiety for up to 30 days. for anxiety for up to 30 days Max Daily Amount: 1.5 mg    .

## 2024-12-31 DIAGNOSIS — E66.811 OBESITY (BMI 30.0-34.9): ICD-10-CM

## 2024-12-31 RX ORDER — PHENTERMINE HYDROCHLORIDE 15 MG/1
15 CAPSULE ORAL EVERY MORNING
Qty: 30 CAPSULE | Refills: 0 | Status: SHIPPED | OUTPATIENT
Start: 2024-12-31 | End: 2025-01-30

## 2024-12-31 NOTE — TELEPHONE ENCOUNTER
RX refill request from the patient/pharmacy. Patient last seen 11- with labs, and next appt. scheduled for 01-  Requested Prescriptions     Pending Prescriptions Disp Refills    phentermine 15 MG capsule [Pharmacy Med Name: PHENTERMINE 15 MG CAPSULE] 30 capsule 0     Sig: Take 1 capsule by mouth every morning for 30 days. Max Daily Amount: 15 mg    .

## 2025-01-03 ENCOUNTER — OFFICE VISIT (OUTPATIENT)
Facility: CLINIC | Age: 25
End: 2025-01-03

## 2025-01-03 VITALS
BODY MASS INDEX: 29.5 KG/M2 | TEMPERATURE: 98.3 F | HEART RATE: 72 BPM | SYSTOLIC BLOOD PRESSURE: 112 MMHG | RESPIRATION RATE: 16 BRPM | OXYGEN SATURATION: 97 % | DIASTOLIC BLOOD PRESSURE: 68 MMHG | HEIGHT: 62 IN | WEIGHT: 160.3 LBS

## 2025-01-03 DIAGNOSIS — E66.811 OBESITY (BMI 30.0-34.9): ICD-10-CM

## 2025-01-03 DIAGNOSIS — R00.2 PALPITATIONS: Primary | ICD-10-CM

## 2025-01-03 DIAGNOSIS — F41.9 ANXIETY: ICD-10-CM

## 2025-01-03 SDOH — ECONOMIC STABILITY: FOOD INSECURITY: WITHIN THE PAST 12 MONTHS, YOU WORRIED THAT YOUR FOOD WOULD RUN OUT BEFORE YOU GOT MONEY TO BUY MORE.: NEVER TRUE

## 2025-01-03 SDOH — ECONOMIC STABILITY: INCOME INSECURITY: HOW HARD IS IT FOR YOU TO PAY FOR THE VERY BASICS LIKE FOOD, HOUSING, MEDICAL CARE, AND HEATING?: NOT VERY HARD

## 2025-01-03 SDOH — ECONOMIC STABILITY: FOOD INSECURITY: WITHIN THE PAST 12 MONTHS, THE FOOD YOU BOUGHT JUST DIDN'T LAST AND YOU DIDN'T HAVE MONEY TO GET MORE.: NEVER TRUE

## 2025-01-03 SDOH — ECONOMIC STABILITY: INCOME INSECURITY: HOW HARD IS IT FOR YOU TO PAY FOR THE VERY BASICS LIKE FOOD, HOUSING, MEDICAL CARE, AND HEATING?: NOT HARD AT ALL

## 2025-01-03 ASSESSMENT — PATIENT HEALTH QUESTIONNAIRE - PHQ9
3. TROUBLE FALLING OR STAYING ASLEEP: NEARLY EVERY DAY
SUM OF ALL RESPONSES TO PHQ QUESTIONS 1-9: 3
SUM OF ALL RESPONSES TO PHQ QUESTIONS 1-9: 3
8. MOVING OR SPEAKING SO SLOWLY THAT OTHER PEOPLE COULD HAVE NOTICED. OR THE OPPOSITE, BEING SO FIGETY OR RESTLESS THAT YOU HAVE BEEN MOVING AROUND A LOT MORE THAN USUAL: NOT AT ALL
SUM OF ALL RESPONSES TO PHQ QUESTIONS 1-9: 3
6. FEELING BAD ABOUT YOURSELF - OR THAT YOU ARE A FAILURE OR HAVE LET YOURSELF OR YOUR FAMILY DOWN: NOT AT ALL
9. THOUGHTS THAT YOU WOULD BE BETTER OFF DEAD, OR OF HURTING YOURSELF: NOT AT ALL
1. LITTLE INTEREST OR PLEASURE IN DOING THINGS: NOT AT ALL
10. IF YOU CHECKED OFF ANY PROBLEMS, HOW DIFFICULT HAVE THESE PROBLEMS MADE IT FOR YOU TO DO YOUR WORK, TAKE CARE OF THINGS AT HOME, OR GET ALONG WITH OTHER PEOPLE: NOT DIFFICULT AT ALL
4. FEELING TIRED OR HAVING LITTLE ENERGY: NOT AT ALL
7. TROUBLE CONCENTRATING ON THINGS, SUCH AS READING THE NEWSPAPER OR WATCHING TELEVISION: NOT AT ALL
SUM OF ALL RESPONSES TO PHQ QUESTIONS 1-9: 3
SUM OF ALL RESPONSES TO PHQ9 QUESTIONS 1 & 2: 0
5. POOR APPETITE OR OVEREATING: NOT AT ALL
2. FEELING DOWN, DEPRESSED OR HOPELESS: NOT AT ALL

## 2025-01-03 NOTE — PROGRESS NOTES
Chief Complaint   Patient presents with    Medication Check     Phentermine f/u       SUBJECTIVE:    Cheyanne L Reyell is a 24 y.o. female who returns in follow-up after being started on phentermine 15 mg at her last visit for obesity she has a history of palpitations in the past with anxiety but she is tolerating medicine quite well without any side effects from either of these.  She currently notes no chest pain, shortness of breath or palpitations.  No PND orthopnea she does note a little dry mouth from the medicine that she had some problems with insomnia but she is taking some melatonin Gummies that seems to be working quite well for that.  She has no other complaints on complete review of systems.      Current Outpatient Medications   Medication Sig Dispense Refill    phentermine 15 MG capsule Take 1 capsule by mouth every morning for 30 days. Max Daily Amount: 15 mg 30 capsule 0    ALPRAZolam (XANAX) 0.5 MG tablet Take 1 tablet by mouth 3 times daily as needed for Anxiety for up to 30 days. for anxiety for up to 30 days Max Daily Amount: 1.5 mg 50 tablet 0    venlafaxine (EFFEXOR XR) 150 MG extended release capsule TAKE 1 CAPSULE BY MOUTH EVERY DAY 90 capsule 1     No current facility-administered medications for this visit.     Past Medical History:   Diagnosis Date    Anxiety     Depression     mild/ Anxiety    Migraines 2014    Palpitations      History reviewed. No pertinent surgical history.  Allergies   Allergen Reactions    Peppermint Oil Hives    Pantoprazole Rash       REVIEW OF SYSTEMS:  General: negative for - chills or fever, or weight loss or gain  ENT: negative for - headaches, nasal congestion or tinnitus  Eyes: no blurred or visual changes  Neck: No stiffness or swollen nodes  Respiratory: negative for - cough, hemoptysis, shortness of breath or wheezing  Cardiovascular : negative for - chest pain, edema, palpitations or shortness of breath  Gastrointestinal: negative for - abdominal pain,

## 2025-01-03 NOTE — PROGRESS NOTES
Cheyanne L Reyell is a 24 y.o. female     Chief Complaint   Patient presents with    Medication Check     Phentermine f/u       /68 (Site: Left Upper Arm, Position: Sitting, Cuff Size: Medium Adult)   Pulse 72   Temp 98.3 °F (36.8 °C) (Oral)   Resp 16   Ht 1.575 m (5' 2\")   Wt 72.7 kg (160 lb 4.8 oz)   SpO2 97%   BMI 29.32 kg/m²     Health Maintenance Due   Topic Date Due    Varicella vaccine (1 of 2 - 13+ 2-dose series) Never done    HPV vaccine (1 - 3-dose series) Never done    HIV screen  Never done    Hepatitis C screen  Never done    Hepatitis B vaccine (1 of 3 - 19+ 3-dose series) Never done    Pap smear  Never done    Flu vaccine (1) Never done    COVID-19 Vaccine (1 - 2023-24 season) Never done         \"Have you been to the ER, urgent care clinic since your last visit?  Hospitalized since your last visit?\"    NO    “Have you seen or consulted any other health care providers outside of [facility] since your last visit?”    NO        “Have you had a pap smear?”    NO    No cervical cancer screening on file

## 2025-01-23 DIAGNOSIS — F32.A DEPRESSION, UNSPECIFIED DEPRESSION TYPE: ICD-10-CM

## 2025-01-23 DIAGNOSIS — F41.9 ANXIETY: ICD-10-CM

## 2025-01-23 RX ORDER — ALPRAZOLAM 0.5 MG
0.5 TABLET ORAL 3 TIMES DAILY PRN
Qty: 50 TABLET | Refills: 0 | Status: SHIPPED | OUTPATIENT
Start: 2025-01-23 | End: 2025-02-22

## 2025-01-23 NOTE — TELEPHONE ENCOUNTER
PCP: Mino Gutierrez MD    Last appt: 1/3/2025  Future Appointments   Date Time Provider Department Center   5/15/2025  8:00 AM Mino Gutierrez MD [facility] DEP       Requested Prescriptions     Pending Prescriptions Disp Refills    ALPRAZolam (XANAX) 0.5 MG tablet [Pharmacy Med Name: ALPRAZOLAM 0.5 MG TABLET] 50 tablet 0     Sig: Take 1 tablet by mouth 3 times daily as needed for Anxiety for up to 30 days. for anxiety for up to 30 days Max Daily Amount: 1.5 mg       Prior labs and Blood pressures:  BP Readings from Last 3 Encounters:   01/03/25 112/68   11/15/24 136/86   06/30/24 132/70     Lab Results   Component Value Date/Time     11/15/2024 04:40 PM    K 4.1 11/15/2024 04:40 PM     11/15/2024 04:40 PM    CO2 27 11/15/2024 04:40 PM    BUN 14 11/15/2024 04:40 PM    GFRAA 129 12/08/2021 09:30 AM     No results found for: \"HBA1C\", \"[id number]\"  Lab Results   Component Value Date/Time    CHOL 175 11/15/2024 04:40 PM    HDL 70 11/15/2024 04:40 PM    LDL 94.8 11/15/2024 04:40 PM    LDL 60 04/28/2023 12:00 AM    VLDL 10.2 11/15/2024 04:40 PM    VLDL 13 04/28/2023 12:00 AM     No results found for: \"VITD3\"        Lab Results   Component Value Date/Time    TSH 0.545 04/30/2024 12:00 AM    TSH 0.82 01/24/2024 10:57 AM

## 2025-02-06 DIAGNOSIS — E66.811 OBESITY (BMI 30.0-34.9): ICD-10-CM

## 2025-02-06 RX ORDER — PHENTERMINE HYDROCHLORIDE 15 MG/1
15 CAPSULE ORAL EVERY MORNING
Qty: 30 CAPSULE | Refills: 0 | Status: SHIPPED | OUTPATIENT
Start: 2025-02-06 | End: 2025-03-08

## 2025-02-06 NOTE — TELEPHONE ENCOUNTER
RX refill request from the patient/pharmacy. Patient last seen 01/03/2025 without labs, and next appt. scheduled for 05/15/2025.   Requested Prescriptions     Pending Prescriptions Disp Refills    phentermine 15 MG capsule [Pharmacy Med Name: PHENTERMINE 15 MG CAPSULE] 30 capsule 0     Sig: Take 1 capsule by mouth every morning for 30 days. Max Daily Amount: 15 mg

## 2025-03-07 DIAGNOSIS — F41.9 ANXIETY: ICD-10-CM

## 2025-03-07 DIAGNOSIS — F32.A DEPRESSION, UNSPECIFIED DEPRESSION TYPE: ICD-10-CM

## 2025-03-07 RX ORDER — ALPRAZOLAM 0.5 MG
0.5 TABLET ORAL 3 TIMES DAILY PRN
Qty: 50 TABLET | Refills: 0 | Status: SHIPPED | OUTPATIENT
Start: 2025-03-07 | End: 2025-04-06

## 2025-03-07 NOTE — TELEPHONE ENCOUNTER
PCP: Mino Gutierrez MD    Last appt: 1/3/2025    Future Appointments   Date Time Provider Department Center   5/15/2025  8:00 AM Mino Gutierrez MD [facility] DEP       Requested Prescriptions     Pending Prescriptions Disp Refills    ALPRAZolam (XANAX) 0.5 MG tablet [Pharmacy Med Name: ALPRAZOLAM 0.5 MG TABLET] 50 tablet 0     Sig: Take 1 tablet by mouth 3 times daily as needed for Anxiety for up to 30 days. for anxiety for up to 30 days Max Daily Amount: 1.5 mg

## 2025-03-31 DIAGNOSIS — E66.811 OBESITY (BMI 30.0-34.9): ICD-10-CM

## 2025-03-31 RX ORDER — PHENTERMINE HYDROCHLORIDE 15 MG/1
15 CAPSULE ORAL EVERY MORNING
Qty: 30 CAPSULE | Refills: 0 | Status: SHIPPED | OUTPATIENT
Start: 2025-03-31 | End: 2025-04-30

## 2025-04-11 DIAGNOSIS — F32.A DEPRESSION, UNSPECIFIED DEPRESSION TYPE: ICD-10-CM

## 2025-04-11 DIAGNOSIS — F41.9 ANXIETY: ICD-10-CM

## 2025-04-14 RX ORDER — ALPRAZOLAM 0.5 MG
TABLET ORAL
Qty: 50 TABLET | Refills: 0 | Status: SHIPPED | OUTPATIENT
Start: 2025-04-14 | End: 2025-05-14

## 2025-04-14 NOTE — TELEPHONE ENCOUNTER
PCP: Mino Gutierrez MD    Last appt: 1/3/2025    Future Appointments   Date Time Provider Department Center   5/15/2025  8:00 AM Mino Gutierrez MD [facility] DEP       Requested Prescriptions     Pending Prescriptions Disp Refills    ALPRAZolam (XANAX) 0.5 MG tablet [Pharmacy Med Name: ALPRAZOLAM 0.5 MG TABLET] 50 tablet 0     Sig: TAKE 1 TABLET BY MOUTH 3 TIMES DAILY AS NEEDED FOR ANXIETY FOR UP TO 30 DAYS, MAX 1.5MG/DAY

## 2025-05-09 DIAGNOSIS — E66.811 OBESITY (BMI 30.0-34.9): ICD-10-CM

## 2025-05-09 RX ORDER — PHENTERMINE HYDROCHLORIDE 15 MG/1
15 CAPSULE ORAL EVERY MORNING
Qty: 30 CAPSULE | Refills: 0 | Status: SHIPPED | OUTPATIENT
Start: 2025-05-09 | End: 2025-06-08

## 2025-05-09 NOTE — TELEPHONE ENCOUNTER
PCP: Mino Gutierrez MD    Last appt: 1/3/2025    Future Appointments   Date Time Provider Department Center   5/20/2025  3:00 PM Mino Gutierrez MD [facility] DEP       Requested Prescriptions     Pending Prescriptions Disp Refills    phentermine 15 MG capsule [Pharmacy Med Name: PHENTERMINE 15 MG CAPSULE] 30 capsule 0     Sig: Take 1 capsule by mouth every morning for 30 days. Max Daily Amount: 15 mg

## 2025-05-14 DIAGNOSIS — F41.9 ANXIETY: ICD-10-CM

## 2025-05-14 DIAGNOSIS — F32.A DEPRESSION, UNSPECIFIED DEPRESSION TYPE: ICD-10-CM

## 2025-05-14 RX ORDER — ALPRAZOLAM 0.5 MG
TABLET ORAL
Qty: 50 TABLET | Refills: 0 | Status: SHIPPED | OUTPATIENT
Start: 2025-05-14 | End: 2025-06-13

## 2025-05-14 NOTE — TELEPHONE ENCOUNTER
RX refill request from the patient/pharmacy. Patient last seen 01- with labs, and next appt. scheduled for 05-  Requested Prescriptions     Pending Prescriptions Disp Refills    ALPRAZolam (XANAX) 0.5 MG tablet [Pharmacy Med Name: ALPRAZOLAM 0.5 MG TABLET] 50 tablet 0     Sig: TAKE 1 TABLET BY MOUTH 3 TIMES DAILY AS NEEDED FOR ANXIETY FOR UP TO 30 DAYS, MAX 1.5MG/DAY    .

## 2025-05-19 NOTE — PROGRESS NOTES
Chief Complaint   Patient presents with    Annual Exam       SUBJECTIVE:    Cheyanne L Reyell is a 24 y.o. female who returns in follow-up for her annual examination as well as follow-up of her medical problems include hiatal hernia with GERD, palpitations, chronic migraine headaches, anxiety and other medical problems.  She is taking her medication seems to be working well.  She denies any chest pain, shortness of breath, palpitations, PND, orthopnea or other cardiac or respiratory complaints.  She occasionally notes when the panic attacks get bad she will feel like her throat is closing up but it does go away.  She denies any GI or  complaints.  She notes no headaches, dizziness or neurologic complaints.  She has no current active arthritic complaints and no other complaints noted.      Current Outpatient Medications   Medication Sig Dispense Refill    ALPRAZolam (XANAX) 0.5 MG tablet TAKE 1 TABLET BY MOUTH 3 TIMES DAILY AS NEEDED FOR ANXIETY FOR UP TO 30 DAYS, MAX 1.5MG/DAY 50 tablet 0    phentermine 15 MG capsule Take 1 capsule by mouth every morning for 30 days. Max Daily Amount: 15 mg 30 capsule 0    venlafaxine (EFFEXOR XR) 150 MG extended release capsule TAKE 1 CAPSULE BY MOUTH EVERY DAY 90 capsule 1     No current facility-administered medications for this visit.     Past Medical History:   Diagnosis Date    Anxiety     Depression     mild/ Anxiety    Migraines 2014    Palpitations      History reviewed. No pertinent surgical history.  Allergies   Allergen Reactions    Peppermint Oil Hives    Pantoprazole Rash       REVIEW OF SYSTEMS:  General: negative for - chills or fever, or weight loss or gain  ENT: negative for - headaches, nasal congestion or tinnitus  Eyes: no blurred or visual changes  Neck: No stiffness or swollen nodes  Respiratory: negative for - cough, hemoptysis, shortness of breath or wheezing  Cardiovascular : negative for - chest pain, edema, palpitations or shortness of

## 2025-05-20 ENCOUNTER — OFFICE VISIT (OUTPATIENT)
Facility: CLINIC | Age: 25
End: 2025-05-20
Payer: COMMERCIAL

## 2025-05-20 VITALS
BODY MASS INDEX: 27.88 KG/M2 | OXYGEN SATURATION: 98 % | WEIGHT: 151.5 LBS | HEIGHT: 62 IN | TEMPERATURE: 98.4 F | DIASTOLIC BLOOD PRESSURE: 80 MMHG | SYSTOLIC BLOOD PRESSURE: 110 MMHG | HEART RATE: 102 BPM

## 2025-05-20 DIAGNOSIS — E66.811 OBESITY (BMI 30.0-34.9): ICD-10-CM

## 2025-05-20 DIAGNOSIS — Z00.00 ANNUAL PHYSICAL EXAM: Primary | ICD-10-CM

## 2025-05-20 DIAGNOSIS — K21.9 HIATAL HERNIA WITH GERD: ICD-10-CM

## 2025-05-20 DIAGNOSIS — F41.9 ANXIETY: ICD-10-CM

## 2025-05-20 DIAGNOSIS — Z86.39 HISTORY OF HYPERTHYROIDISM: ICD-10-CM

## 2025-05-20 DIAGNOSIS — F32.A DEPRESSION, UNSPECIFIED DEPRESSION TYPE: ICD-10-CM

## 2025-05-20 DIAGNOSIS — K44.9 HIATAL HERNIA WITH GERD: ICD-10-CM

## 2025-05-20 PROCEDURE — 99395 PREV VISIT EST AGE 18-39: CPT | Performed by: INTERNAL MEDICINE

## 2025-05-20 ASSESSMENT — PATIENT HEALTH QUESTIONNAIRE - PHQ9
SUM OF ALL RESPONSES TO PHQ QUESTIONS 1-9: 0
SUM OF ALL RESPONSES TO PHQ QUESTIONS 1-9: 0
1. LITTLE INTEREST OR PLEASURE IN DOING THINGS: NOT AT ALL
SUM OF ALL RESPONSES TO PHQ QUESTIONS 1-9: 0
SUM OF ALL RESPONSES TO PHQ QUESTIONS 1-9: 0
2. FEELING DOWN, DEPRESSED OR HOPELESS: NOT AT ALL

## 2025-05-20 NOTE — PROGRESS NOTES
Cheyanne L Reyell is a 24 y.o. female     Chief Complaint   Patient presents with    Annual Exam       \"Have you been to the ER, urgent care clinic since your last visit?  Hospitalized since your last visit?\"    NO    “Have you seen or consulted any other health care providers outside of [facility] since your last visit?”    Went to [facility] ER for left arm injury        “Have you had a pap smear?”    NO    No cervical cancer screening on file

## 2025-05-21 ENCOUNTER — RESULTS FOLLOW-UP (OUTPATIENT)
Facility: CLINIC | Age: 25
End: 2025-05-21

## 2025-05-21 LAB
ALBUMIN SERPL-MCNC: 4.6 G/DL (ref 3.5–5)
ALBUMIN/GLOB SERPL: 1.2 (ref 1.1–2.2)
ALP SERPL-CCNC: 46 U/L (ref 45–117)
ALT SERPL-CCNC: 27 U/L (ref 12–78)
ANION GAP SERPL CALC-SCNC: 7 MMOL/L (ref 2–12)
APPEARANCE UR: CLEAR
AST SERPL-CCNC: 29 U/L (ref 15–37)
BACTERIA URNS QL MICRO: ABNORMAL /HPF
BASOPHILS # BLD: 0.06 K/UL (ref 0–0.1)
BASOPHILS NFR BLD: 0.8 % (ref 0–1)
BILIRUB SERPL-MCNC: 0.5 MG/DL (ref 0.2–1)
BILIRUB UR QL: NEGATIVE
BUN SERPL-MCNC: 14 MG/DL (ref 6–20)
BUN/CREAT SERPL: 18 (ref 12–20)
CALCIUM SERPL-MCNC: 9.9 MG/DL (ref 8.5–10.1)
CHLORIDE SERPL-SCNC: 103 MMOL/L (ref 97–108)
CHOLEST SERPL-MCNC: 132 MG/DL
CO2 SERPL-SCNC: 26 MMOL/L (ref 21–32)
COLOR UR: ABNORMAL
CREAT SERPL-MCNC: 0.76 MG/DL (ref 0.55–1.02)
DIFFERENTIAL METHOD BLD: NORMAL
EOSINOPHIL # BLD: 0.11 K/UL (ref 0–0.4)
EOSINOPHIL NFR BLD: 1.4 % (ref 0–7)
EPITH CASTS URNS QL MICRO: ABNORMAL /LPF
ERYTHROCYTE [DISTWIDTH] IN BLOOD BY AUTOMATED COUNT: 12.3 % (ref 11.5–14.5)
GLOBULIN SER CALC-MCNC: 4 G/DL (ref 2–4)
GLUCOSE SERPL-MCNC: 89 MG/DL (ref 65–100)
GLUCOSE UR STRIP.AUTO-MCNC: NEGATIVE MG/DL
HCT VFR BLD AUTO: 41.5 % (ref 35–47)
HDLC SERPL-MCNC: 65 MG/DL
HDLC SERPL: 2 (ref 0–5)
HGB BLD-MCNC: 13.8 G/DL (ref 11.5–16)
HGB UR QL STRIP: ABNORMAL
HYALINE CASTS URNS QL MICRO: ABNORMAL /LPF (ref 0–5)
IMM GRANULOCYTES # BLD AUTO: 0.01 K/UL (ref 0–0.04)
IMM GRANULOCYTES NFR BLD AUTO: 0.1 % (ref 0–0.5)
KETONES UR QL STRIP.AUTO: NEGATIVE MG/DL
LDLC SERPL CALC-MCNC: 52.8 MG/DL (ref 0–100)
LEUKOCYTE ESTERASE UR QL STRIP.AUTO: NEGATIVE
LYMPHOCYTES # BLD: 2.86 K/UL (ref 0.8–3.5)
LYMPHOCYTES NFR BLD: 36.5 % (ref 12–49)
MCH RBC QN AUTO: 30.7 PG (ref 26–34)
MCHC RBC AUTO-ENTMCNC: 33.3 G/DL (ref 30–36.5)
MCV RBC AUTO: 92.2 FL (ref 80–99)
MONOCYTES # BLD: 0.66 K/UL (ref 0–1)
MONOCYTES NFR BLD: 8.4 % (ref 5–13)
NEUTS SEG # BLD: 4.14 K/UL (ref 1.8–8)
NEUTS SEG NFR BLD: 52.8 % (ref 32–75)
NITRITE UR QL STRIP.AUTO: NEGATIVE
NRBC # BLD: 0 K/UL (ref 0–0.01)
NRBC BLD-RTO: 0 PER 100 WBC
PH UR STRIP: 6.5 (ref 5–8)
PLATELET # BLD AUTO: 334 K/UL (ref 150–400)
PMV BLD AUTO: 11.1 FL (ref 8.9–12.9)
POTASSIUM SERPL-SCNC: 4.4 MMOL/L (ref 3.5–5.1)
PROT SERPL-MCNC: 8.6 G/DL (ref 6.4–8.2)
PROT UR STRIP-MCNC: NEGATIVE MG/DL
RBC # BLD AUTO: 4.5 M/UL (ref 3.8–5.2)
RBC #/AREA URNS HPF: ABNORMAL /HPF (ref 0–5)
SODIUM SERPL-SCNC: 136 MMOL/L (ref 136–145)
SP GR UR REFRACTOMETRY: 1.01 (ref 1–1.03)
T4 FREE SERPL-MCNC: 0.9 NG/DL (ref 0.8–1.5)
TRIGL SERPL-MCNC: 71 MG/DL
TSH SERPL DL<=0.05 MIU/L-ACNC: 0.78 UIU/ML (ref 0.36–3.74)
URINE CULTURE IF INDICATED: ABNORMAL
UROBILINOGEN UR QL STRIP.AUTO: 0.2 EU/DL (ref 0.2–1)
VLDLC SERPL CALC-MCNC: 14.2 MG/DL
WBC # BLD AUTO: 7.8 K/UL (ref 3.6–11)
WBC URNS QL MICRO: ABNORMAL /HPF (ref 0–4)

## 2025-06-11 DIAGNOSIS — F32.A DEPRESSION, UNSPECIFIED DEPRESSION TYPE: ICD-10-CM

## 2025-06-11 DIAGNOSIS — E66.811 OBESITY (BMI 30.0-34.9): ICD-10-CM

## 2025-06-11 DIAGNOSIS — F41.9 ANXIETY: ICD-10-CM

## 2025-06-11 RX ORDER — PHENTERMINE HYDROCHLORIDE 15 MG/1
15 CAPSULE ORAL EVERY MORNING
Qty: 30 CAPSULE | Refills: 0 | Status: SHIPPED | OUTPATIENT
Start: 2025-06-11 | End: 2025-07-11

## 2025-06-11 RX ORDER — ALPRAZOLAM 0.5 MG
TABLET ORAL
Qty: 50 TABLET | Refills: 0 | Status: SHIPPED | OUTPATIENT
Start: 2025-06-11 | End: 2025-07-11

## 2025-06-18 PROBLEM — Z00.00 ANNUAL PHYSICAL EXAM: Status: RESOLVED | Noted: 2020-01-28 | Resolved: 2025-06-18

## 2025-07-09 DIAGNOSIS — F41.9 ANXIETY: ICD-10-CM

## 2025-07-09 DIAGNOSIS — F32.A DEPRESSION, UNSPECIFIED DEPRESSION TYPE: ICD-10-CM

## 2025-07-10 RX ORDER — ALPRAZOLAM 0.5 MG
TABLET ORAL
Qty: 50 TABLET | Refills: 0 | Status: SHIPPED | OUTPATIENT
Start: 2025-07-10 | End: 2025-08-09

## 2025-07-10 NOTE — TELEPHONE ENCOUNTER
PCP: Mino Gutierrez MD    Last appt: 5/20/2025    Future Appointments   Date Time Provider Department Center   9/4/2025  9:20 AM Mino Gutierrez MD [facility] DEP       Requested Prescriptions     Pending Prescriptions Disp Refills    ALPRAZolam (XANAX) 0.5 MG tablet [Pharmacy Med Name: ALPRAZOLAM 0.5 MG TABLET] 50 tablet 0     Sig: TAKE 1 TABLET BY MOUTH 3 TIMES DAILY AS NEEDED FOR ANXIETY FOR UP TO 30 DAYS, MAX 1.5MG/DAY

## 2025-08-06 DIAGNOSIS — E66.811 OBESITY (BMI 30.0-34.9): ICD-10-CM

## 2025-08-06 DIAGNOSIS — F32.A DEPRESSION, UNSPECIFIED DEPRESSION TYPE: ICD-10-CM

## 2025-08-06 DIAGNOSIS — F41.9 ANXIETY: ICD-10-CM

## 2025-08-06 RX ORDER — PHENTERMINE HYDROCHLORIDE 15 MG/1
15 CAPSULE ORAL EVERY MORNING
Qty: 30 CAPSULE | Refills: 0 | Status: SHIPPED | OUTPATIENT
Start: 2025-08-06 | End: 2025-09-05

## 2025-08-06 RX ORDER — ALPRAZOLAM 0.5 MG
TABLET ORAL
Qty: 50 TABLET | Refills: 0 | Status: SHIPPED | OUTPATIENT
Start: 2025-08-06 | End: 2025-09-05

## 2025-09-02 DIAGNOSIS — F41.9 ANXIETY: ICD-10-CM

## 2025-09-02 DIAGNOSIS — F32.A DEPRESSION, UNSPECIFIED DEPRESSION TYPE: ICD-10-CM

## 2025-09-02 DIAGNOSIS — E66.811 OBESITY (BMI 30.0-34.9): ICD-10-CM

## 2025-09-02 RX ORDER — PHENTERMINE HYDROCHLORIDE 15 MG/1
15 CAPSULE ORAL EVERY MORNING
Qty: 30 CAPSULE | Refills: 0 | Status: SHIPPED | OUTPATIENT
Start: 2025-09-02 | End: 2025-10-02

## 2025-09-02 RX ORDER — ALPRAZOLAM 0.5 MG
TABLET ORAL
Qty: 50 TABLET | Refills: 0 | Status: SHIPPED | OUTPATIENT
Start: 2025-09-02 | End: 2025-10-02

## 2025-09-02 SDOH — ECONOMIC STABILITY: INCOME INSECURITY: IN THE LAST 12 MONTHS, WAS THERE A TIME WHEN YOU WERE NOT ABLE TO PAY THE MORTGAGE OR RENT ON TIME?: NO

## 2025-09-02 SDOH — ECONOMIC STABILITY: FOOD INSECURITY: WITHIN THE PAST 12 MONTHS, THE FOOD YOU BOUGHT JUST DIDN'T LAST AND YOU DIDN'T HAVE MONEY TO GET MORE.: NEVER TRUE

## 2025-09-02 SDOH — ECONOMIC STABILITY: FOOD INSECURITY: WITHIN THE PAST 12 MONTHS, YOU WORRIED THAT YOUR FOOD WOULD RUN OUT BEFORE YOU GOT MONEY TO BUY MORE.: NEVER TRUE

## 2025-09-02 SDOH — ECONOMIC STABILITY: TRANSPORTATION INSECURITY
IN THE PAST 12 MONTHS, HAS THE LACK OF TRANSPORTATION KEPT YOU FROM MEDICAL APPOINTMENTS OR FROM GETTING MEDICATIONS?: NO

## 2025-09-04 ENCOUNTER — OFFICE VISIT (OUTPATIENT)
Facility: CLINIC | Age: 25
End: 2025-09-04
Payer: COMMERCIAL

## 2025-09-04 VITALS
HEART RATE: 90 BPM | DIASTOLIC BLOOD PRESSURE: 77 MMHG | BODY MASS INDEX: 27.07 KG/M2 | SYSTOLIC BLOOD PRESSURE: 108 MMHG | OXYGEN SATURATION: 98 % | WEIGHT: 148 LBS

## 2025-09-04 DIAGNOSIS — F41.9 ANXIETY: Primary | ICD-10-CM

## 2025-09-04 DIAGNOSIS — E66.811 OBESITY (BMI 30.0-34.9): ICD-10-CM

## 2025-09-04 DIAGNOSIS — E78.1 HYPERTRIGLYCERIDEMIA WITHOUT HYPERCHOLESTEROLEMIA: ICD-10-CM

## 2025-09-04 DIAGNOSIS — F32.A DEPRESSION, UNSPECIFIED DEPRESSION TYPE: ICD-10-CM

## 2025-09-04 LAB
CHOLEST SERPL-MCNC: 159 MG/DL (ref 0–200)
HDLC SERPL-MCNC: 78 MG/DL (ref 40–60)
HDLC SERPL: 2 (ref 0–5)
LDLC SERPL CALC-MCNC: 70 MG/DL (ref 0–100)
TRIGL SERPL-MCNC: 58 MG/DL (ref 0–150)
VLDLC SERPL CALC-MCNC: 12 MG/DL

## 2025-09-04 PROCEDURE — 99214 OFFICE O/P EST MOD 30 MIN: CPT | Performed by: INTERNAL MEDICINE

## 2025-09-04 RX ORDER — ESCITALOPRAM OXALATE 10 MG/1
10 TABLET ORAL DAILY
Qty: 30 TABLET | Refills: 3 | Status: SHIPPED | OUTPATIENT
Start: 2025-09-04